# Patient Record
Sex: MALE | Race: WHITE | NOT HISPANIC OR LATINO | Employment: OTHER | URBAN - METROPOLITAN AREA
[De-identification: names, ages, dates, MRNs, and addresses within clinical notes are randomized per-mention and may not be internally consistent; named-entity substitution may affect disease eponyms.]

---

## 2017-02-01 ENCOUNTER — GENERIC CONVERSION - ENCOUNTER (OUTPATIENT)
Dept: OTHER | Facility: OTHER | Age: 72
End: 2017-02-01

## 2017-03-02 ENCOUNTER — HOSPITAL ENCOUNTER (INPATIENT)
Facility: HOSPITAL | Age: 72
LOS: 1 days | Discharge: HOME | DRG: 871 | End: 2017-03-04
Attending: EMERGENCY MEDICINE | Admitting: HOSPITALIST
Payer: COMMERCIAL

## 2017-03-02 ENCOUNTER — APPOINTMENT (EMERGENCY)
Dept: RADIOLOGY | Facility: HOSPITAL | Age: 72
DRG: 871 | End: 2017-03-02
Payer: COMMERCIAL

## 2017-03-02 DIAGNOSIS — R91.8 MASS OF UPPER LOBE OF RIGHT LUNG: ICD-10-CM

## 2017-03-02 DIAGNOSIS — N17.9 ACUTE KIDNEY INJURY (HCC): ICD-10-CM

## 2017-03-02 DIAGNOSIS — J18.9 COMMUNITY ACQUIRED PNEUMONIA: Primary | ICD-10-CM

## 2017-03-02 DIAGNOSIS — E86.0 DEHYDRATION: ICD-10-CM

## 2017-03-02 LAB
ANION GAP BLD CALC-SCNC: 20 MMOL/L (ref 4–13)
ANION GAP SERPL CALCULATED.3IONS-SCNC: 8 MMOL/L (ref 4–13)
BASE EXCESS BLDA CALC-SCNC: -1 MMOL/L (ref -2–3)
BASOPHILS # BLD AUTO: 0.01 THOUSANDS/ΜL (ref 0–0.1)
BASOPHILS NFR BLD AUTO: 0 % (ref 0–1)
BUN BLD-MCNC: 29 MG/DL (ref 5–25)
BUN SERPL-MCNC: 27 MG/DL (ref 5–25)
CA-I BLD-SCNC: 1.02 MMOL/L (ref 1.12–1.32)
CA-I BLD-SCNC: 1.03 MMOL/L (ref 1.12–1.32)
CALCIUM SERPL-MCNC: 8.8 MG/DL (ref 8.3–10.1)
CHLORIDE BLD-SCNC: 99 MMOL/L (ref 100–108)
CHLORIDE SERPL-SCNC: 104 MMOL/L (ref 100–108)
CO2 SERPL-SCNC: 30 MMOL/L (ref 21–32)
CREAT BLD-MCNC: 1.1 MG/DL (ref 0.6–1.3)
CREAT SERPL-MCNC: 1.47 MG/DL (ref 0.6–1.3)
EOSINOPHIL # BLD AUTO: 0.02 THOUSAND/ΜL (ref 0–0.61)
EOSINOPHIL NFR BLD AUTO: 0 % (ref 0–6)
ERYTHROCYTE [DISTWIDTH] IN BLOOD BY AUTOMATED COUNT: 15 % (ref 11.6–15.1)
GFR SERPL CREATININE-BSD FRML MDRD: 47.4 ML/MIN/1.73SQ M
GFR SERPL CREATININE-BSD FRML MDRD: >60 ML/MIN/1.73SQ M
GLUCOSE SERPL-MCNC: 150 MG/DL (ref 65–140)
GLUCOSE SERPL-MCNC: 151 MG/DL (ref 65–140)
GLUCOSE SERPL-MCNC: 168 MG/DL (ref 65–140)
HCO3 BLDA-SCNC: 22.9 MMOL/L (ref 24–30)
HCT VFR BLD AUTO: 35 % (ref 36.5–49.3)
HCT VFR BLD CALC: 28 % (ref 36.5–49.3)
HCT VFR BLD CALC: 28 % (ref 36.5–49.3)
HGB BLD-MCNC: 11.5 G/DL (ref 12–17)
HGB BLDA-MCNC: 9.5 G/DL (ref 12–17)
HGB BLDA-MCNC: 9.5 G/DL (ref 12–17)
LYMPHOCYTES # BLD AUTO: 1.45 THOUSANDS/ΜL (ref 0.6–4.47)
LYMPHOCYTES NFR BLD AUTO: 16 % (ref 14–44)
MCH RBC QN AUTO: 31.8 PG (ref 26.8–34.3)
MCHC RBC AUTO-ENTMCNC: 32.9 G/DL (ref 31.4–37.4)
MCV RBC AUTO: 97 FL (ref 82–98)
MONOCYTES # BLD AUTO: 0.88 THOUSAND/ΜL (ref 0.17–1.22)
MONOCYTES NFR BLD AUTO: 10 % (ref 4–12)
NEUTROPHILS # BLD AUTO: 6.76 THOUSANDS/ΜL (ref 1.85–7.62)
NEUTS SEG NFR BLD AUTO: 74 % (ref 43–75)
NRBC BLD AUTO-RTO: 0 /100 WBCS
PCO2 BLD: 23 MMOL/L (ref 21–32)
PCO2 BLD: 24 MMOL/L (ref 21–32)
PCO2 BLD: 36 MM HG (ref 42–50)
PH BLD: 7.41 [PH] (ref 7.3–7.4)
PLATELET # BLD AUTO: 236 THOUSANDS/UL (ref 149–390)
PMV BLD AUTO: 11.6 FL (ref 8.9–12.7)
PO2 BLD: 40 MM HG (ref 35–45)
POTASSIUM BLD-SCNC: 4.3 MMOL/L (ref 3.5–5.3)
POTASSIUM BLD-SCNC: 4.4 MMOL/L (ref 3.5–5.3)
POTASSIUM SERPL-SCNC: 4.2 MMOL/L (ref 3.5–5.3)
RBC # BLD AUTO: 3.62 MILLION/UL (ref 3.88–5.62)
SAO2 % BLD FROM PO2: 75 % (ref 95–98)
SODIUM BLD-SCNC: 136 MMOL/L (ref 136–145)
SODIUM BLD-SCNC: 136 MMOL/L (ref 136–145)
SODIUM SERPL-SCNC: 142 MMOL/L (ref 136–145)
SPECIMEN SOURCE: ABNORMAL
SPECIMEN SOURCE: ABNORMAL
SPECIMEN SOURCE: NORMAL
TROPONIN I BLD-MCNC: 0 NG/ML (ref 0–0.08)
TROPONIN I SERPL-MCNC: <0.02 NG/ML
WBC # BLD AUTO: 9.14 THOUSAND/UL (ref 4.31–10.16)

## 2017-03-02 PROCEDURE — 87040 BLOOD CULTURE FOR BACTERIA: CPT | Performed by: EMERGENCY MEDICINE

## 2017-03-02 PROCEDURE — 36415 COLL VENOUS BLD VENIPUNCTURE: CPT

## 2017-03-02 PROCEDURE — 82803 BLOOD GASES ANY COMBINATION: CPT

## 2017-03-02 PROCEDURE — 70498 CT ANGIOGRAPHY NECK: CPT

## 2017-03-02 PROCEDURE — 70450 CT HEAD/BRAIN W/O DYE: CPT

## 2017-03-02 PROCEDURE — 93005 ELECTROCARDIOGRAM TRACING: CPT | Performed by: EMERGENCY MEDICINE

## 2017-03-02 PROCEDURE — 85025 COMPLETE CBC W/AUTO DIFF WBC: CPT | Performed by: EMERGENCY MEDICINE

## 2017-03-02 PROCEDURE — 84484 ASSAY OF TROPONIN QUANT: CPT

## 2017-03-02 PROCEDURE — 82947 ASSAY GLUCOSE BLOOD QUANT: CPT

## 2017-03-02 PROCEDURE — 87400 INFLUENZA A/B EACH AG IA: CPT | Performed by: EMERGENCY MEDICINE

## 2017-03-02 PROCEDURE — 80048 BASIC METABOLIC PNL TOTAL CA: CPT | Performed by: EMERGENCY MEDICINE

## 2017-03-02 PROCEDURE — 85014 HEMATOCRIT: CPT

## 2017-03-02 PROCEDURE — 84295 ASSAY OF SERUM SODIUM: CPT

## 2017-03-02 PROCEDURE — 96365 THER/PROPH/DIAG IV INF INIT: CPT

## 2017-03-02 PROCEDURE — 83605 ASSAY OF LACTIC ACID: CPT | Performed by: EMERGENCY MEDICINE

## 2017-03-02 PROCEDURE — 82330 ASSAY OF CALCIUM: CPT

## 2017-03-02 PROCEDURE — 84132 ASSAY OF SERUM POTASSIUM: CPT

## 2017-03-02 PROCEDURE — 87798 DETECT AGENT NOS DNA AMP: CPT | Performed by: EMERGENCY MEDICINE

## 2017-03-02 PROCEDURE — 80047 BASIC METABLC PNL IONIZED CA: CPT

## 2017-03-02 PROCEDURE — 71275 CT ANGIOGRAPHY CHEST: CPT

## 2017-03-02 PROCEDURE — 96360 HYDRATION IV INFUSION INIT: CPT

## 2017-03-02 PROCEDURE — 93005 ELECTROCARDIOGRAM TRACING: CPT

## 2017-03-02 PROCEDURE — 84484 ASSAY OF TROPONIN QUANT: CPT | Performed by: EMERGENCY MEDICINE

## 2017-03-02 RX ORDER — ACETAMINOPHEN 325 MG/1
650 TABLET ORAL EVERY 6 HOURS SCHEDULED
Status: DISCONTINUED | OUTPATIENT
Start: 2017-03-03 | End: 2017-03-04 | Stop reason: HOSPADM

## 2017-03-02 RX ADMIN — SODIUM CHLORIDE 1000 ML: 0.9 INJECTION, SOLUTION INTRAVENOUS at 23:56

## 2017-03-02 RX ADMIN — CEFTRIAXONE 1000 MG: 1 INJECTION, POWDER, FOR SOLUTION INTRAMUSCULAR; INTRAVENOUS at 23:56

## 2017-03-02 RX ADMIN — SODIUM CHLORIDE 1000 ML: 0.9 INJECTION, SOLUTION INTRAVENOUS at 23:42

## 2017-03-02 RX ADMIN — IOHEXOL 100 ML: 350 INJECTION, SOLUTION INTRAVENOUS at 23:21

## 2017-03-03 PROBLEM — R91.1 PULMONARY NODULE: Status: ACTIVE | Noted: 2017-03-03

## 2017-03-03 PROBLEM — I10 ACCELERATED HYPERTENSION: Status: ACTIVE | Noted: 2017-03-03

## 2017-03-03 PROBLEM — J96.01 ACUTE RESPIRATORY FAILURE WITH HYPOXEMIA (HCC): Status: ACTIVE | Noted: 2017-03-03

## 2017-03-03 PROBLEM — R41.82 ALTERED MENTAL STATUS: Status: ACTIVE | Noted: 2017-03-03

## 2017-03-03 PROBLEM — A41.9 SEPSIS (HCC): Status: ACTIVE | Noted: 2017-03-03

## 2017-03-03 PROBLEM — N17.9 AKI (ACUTE KIDNEY INJURY) (HCC): Status: ACTIVE | Noted: 2017-03-03

## 2017-03-03 PROBLEM — E13.9 DIABETES 1.5, MANAGED AS TYPE 2 (HCC): Status: ACTIVE | Noted: 2017-03-03

## 2017-03-03 PROBLEM — C43.9 MELANOMA (HCC): Status: ACTIVE | Noted: 2017-03-03

## 2017-03-03 LAB
ANION GAP SERPL CALCULATED.3IONS-SCNC: 8 MMOL/L (ref 4–13)
ATRIAL RATE: 104 BPM
ATRIAL RATE: 202 BPM
BACTERIA UR QL AUTO: NORMAL /HPF
BILIRUB UR QL STRIP: NEGATIVE
BUN SERPL-MCNC: 23 MG/DL (ref 5–25)
CALCIUM SERPL-MCNC: 8 MG/DL (ref 8.3–10.1)
CHLORIDE SERPL-SCNC: 110 MMOL/L (ref 100–108)
CLARITY UR: CLEAR
CO2 SERPL-SCNC: 24 MMOL/L (ref 21–32)
COLOR UR: YELLOW
CREAT SERPL-MCNC: 1.24 MG/DL (ref 0.6–1.3)
ERYTHROCYTE [DISTWIDTH] IN BLOOD BY AUTOMATED COUNT: 15 % (ref 11.6–15.1)
FLUAV AG SPEC QL IA: NEGATIVE
FLUAV AG SPEC QL: NORMAL
FLUBV AG SPEC QL IA: NEGATIVE
FLUBV AG SPEC QL: NORMAL
GFR SERPL CREATININE-BSD FRML MDRD: 57.6 ML/MIN/1.73SQ M
GLUCOSE SERPL-MCNC: 134 MG/DL (ref 65–140)
GLUCOSE SERPL-MCNC: 144 MG/DL (ref 65–140)
GLUCOSE SERPL-MCNC: 183 MG/DL (ref 65–140)
GLUCOSE SERPL-MCNC: 200 MG/DL (ref 65–140)
GLUCOSE UR STRIP-MCNC: NEGATIVE MG/DL
HCT VFR BLD AUTO: 31.7 % (ref 36.5–49.3)
HGB BLD-MCNC: 10.4 G/DL (ref 12–17)
HGB UR QL STRIP.AUTO: ABNORMAL
HOLD SPECIMEN: NORMAL
HYALINE CASTS #/AREA URNS LPF: NORMAL /LPF
KETONES UR STRIP-MCNC: NEGATIVE MG/DL
L PNEUMO1 AG UR QL IA.RAPID: NEGATIVE
LACTATE SERPL-SCNC: 1.7 MMOL/L (ref 0.5–2)
LACTATE SERPL-SCNC: 2.1 MMOL/L (ref 0.5–2)
LEUKOCYTE ESTERASE UR QL STRIP: NEGATIVE
MCH RBC QN AUTO: 31.6 PG (ref 26.8–34.3)
MCHC RBC AUTO-ENTMCNC: 32.8 G/DL (ref 31.4–37.4)
MCV RBC AUTO: 96 FL (ref 82–98)
NITRITE UR QL STRIP: NEGATIVE
NON-SQ EPI CELLS URNS QL MICRO: NORMAL /HPF
P AXIS: 269 DEGREES
PH UR STRIP.AUTO: 5.5 [PH] (ref 4.5–8)
PLATELET # BLD AUTO: 200 THOUSANDS/UL (ref 149–390)
PLATELET # BLD AUTO: 200 THOUSANDS/UL (ref 149–390)
PMV BLD AUTO: 11.5 FL (ref 8.9–12.7)
PMV BLD AUTO: 11.6 FL (ref 8.9–12.7)
POTASSIUM SERPL-SCNC: 4 MMOL/L (ref 3.5–5.3)
PROT UR STRIP-MCNC: NEGATIVE MG/DL
QRS AXIS: 26 DEGREES
QRS AXIS: 39 DEGREES
QRSD INTERVAL: 70 MS
QRSD INTERVAL: 76 MS
QT INTERVAL: 288 MS
QT INTERVAL: 308 MS
QTC INTERVAL: 396 MS
QTC INTERVAL: 501 MS
RBC # BLD AUTO: 3.29 MILLION/UL (ref 3.88–5.62)
RBC #/AREA URNS AUTO: NORMAL /HPF
RSV B RNA SPEC QL NAA+PROBE: NORMAL
S PNEUM AG UR QL: NEGATIVE
SODIUM SERPL-SCNC: 142 MMOL/L (ref 136–145)
SP GR UR STRIP.AUTO: 1.01 (ref 1–1.03)
T WAVE AXIS: 63 DEGREES
T WAVE AXIS: 85 DEGREES
UROBILINOGEN UR QL STRIP.AUTO: 0.2 E.U./DL
VENTRICULAR RATE: 114 BPM
VENTRICULAR RATE: 159 BPM
WBC # BLD AUTO: 3.51 THOUSAND/UL (ref 4.31–10.16)
WBC #/AREA URNS AUTO: NORMAL /HPF

## 2017-03-03 PROCEDURE — 36415 COLL VENOUS BLD VENIPUNCTURE: CPT | Performed by: HOSPITALIST

## 2017-03-03 PROCEDURE — 94760 N-INVAS EAR/PLS OXIMETRY 1: CPT

## 2017-03-03 PROCEDURE — 99285 EMERGENCY DEPT VISIT HI MDM: CPT

## 2017-03-03 PROCEDURE — 81001 URINALYSIS AUTO W/SCOPE: CPT

## 2017-03-03 PROCEDURE — 85049 AUTOMATED PLATELET COUNT: CPT | Performed by: HOSPITALIST

## 2017-03-03 PROCEDURE — 87205 SMEAR GRAM STAIN: CPT | Performed by: HOSPITALIST

## 2017-03-03 PROCEDURE — 87086 URINE CULTURE/COLONY COUNT: CPT

## 2017-03-03 PROCEDURE — 80048 BASIC METABOLIC PNL TOTAL CA: CPT | Performed by: HOSPITALIST

## 2017-03-03 PROCEDURE — 87070 CULTURE OTHR SPECIMN AEROBIC: CPT | Performed by: HOSPITALIST

## 2017-03-03 PROCEDURE — 87449 NOS EACH ORGANISM AG IA: CPT | Performed by: HOSPITALIST

## 2017-03-03 PROCEDURE — 85027 COMPLETE CBC AUTOMATED: CPT | Performed by: HOSPITALIST

## 2017-03-03 PROCEDURE — 83605 ASSAY OF LACTIC ACID: CPT | Performed by: HOSPITALIST

## 2017-03-03 PROCEDURE — 82948 REAGENT STRIP/BLOOD GLUCOSE: CPT

## 2017-03-03 RX ORDER — PRAVASTATIN SODIUM 10 MG
10 TABLET ORAL
Status: DISCONTINUED | OUTPATIENT
Start: 2017-03-03 | End: 2017-03-04 | Stop reason: HOSPADM

## 2017-03-03 RX ORDER — DOCUSATE SODIUM 100 MG/1
100 CAPSULE, LIQUID FILLED ORAL 2 TIMES DAILY
Status: DISCONTINUED | OUTPATIENT
Start: 2017-03-03 | End: 2017-03-04 | Stop reason: HOSPADM

## 2017-03-03 RX ORDER — MELATONIN
1000 DAILY
Status: DISCONTINUED | OUTPATIENT
Start: 2017-03-03 | End: 2017-03-04 | Stop reason: HOSPADM

## 2017-03-03 RX ORDER — IRBESARTAN 75 MG/1
75 TABLET ORAL DAILY
Status: DISCONTINUED | OUTPATIENT
Start: 2017-03-03 | End: 2017-03-04 | Stop reason: HOSPADM

## 2017-03-03 RX ORDER — SENNOSIDES 8.6 MG
1 TABLET ORAL DAILY
Status: DISCONTINUED | OUTPATIENT
Start: 2017-03-03 | End: 2017-03-04 | Stop reason: HOSPADM

## 2017-03-03 RX ORDER — ONDANSETRON 2 MG/ML
4 INJECTION INTRAMUSCULAR; INTRAVENOUS EVERY 6 HOURS PRN
Status: DISCONTINUED | OUTPATIENT
Start: 2017-03-03 | End: 2017-03-04 | Stop reason: HOSPADM

## 2017-03-03 RX ORDER — ALBUTEROL SULFATE 90 UG/1
2 AEROSOL, METERED RESPIRATORY (INHALATION) EVERY 4 HOURS PRN
Status: DISCONTINUED | OUTPATIENT
Start: 2017-03-03 | End: 2017-03-04 | Stop reason: HOSPADM

## 2017-03-03 RX ORDER — SODIUM CHLORIDE 9 MG/ML
90 INJECTION, SOLUTION INTRAVENOUS CONTINUOUS
Status: DISCONTINUED | OUTPATIENT
Start: 2017-03-03 | End: 2017-03-03

## 2017-03-03 RX ORDER — PRAVASTATIN SODIUM 10 MG
10 TABLET ORAL DAILY
COMMUNITY

## 2017-03-03 RX ORDER — ASPIRIN 81 MG/1
81 TABLET, CHEWABLE ORAL DAILY
Status: DISCONTINUED | OUTPATIENT
Start: 2017-03-03 | End: 2017-03-04 | Stop reason: HOSPADM

## 2017-03-03 RX ORDER — ACETAMINOPHEN 325 MG/1
650 TABLET ORAL EVERY 4 HOURS PRN
Status: DISCONTINUED | OUTPATIENT
Start: 2017-03-03 | End: 2017-03-04 | Stop reason: HOSPADM

## 2017-03-03 RX ORDER — SODIUM CHLORIDE 9 MG/ML
100 INJECTION, SOLUTION INTRAVENOUS CONTINUOUS
Status: DISCONTINUED | OUTPATIENT
Start: 2017-03-03 | End: 2017-03-04

## 2017-03-03 RX ADMIN — ACETAMINOPHEN 650 MG: 325 TABLET, FILM COATED ORAL at 00:03

## 2017-03-03 RX ADMIN — INSULIN LISPRO 1 UNITS: 100 INJECTION, SOLUTION INTRAVENOUS; SUBCUTANEOUS at 22:09

## 2017-03-03 RX ADMIN — ASPIRIN 81 MG: 81 TABLET, CHEWABLE ORAL at 13:47

## 2017-03-03 RX ADMIN — ENOXAPARIN SODIUM 40 MG: 40 INJECTION SUBCUTANEOUS at 08:42

## 2017-03-03 RX ADMIN — IRBESARTAN 75 MG: 75 TABLET ORAL at 08:44

## 2017-03-03 RX ADMIN — PRAVASTATIN SODIUM 10 MG: 10 TABLET ORAL at 18:16

## 2017-03-03 RX ADMIN — VITAMIN D, TAB 1000IU (100/BT) 1000 UNITS: 25 TAB at 08:44

## 2017-03-03 RX ADMIN — SODIUM CHLORIDE 90 ML/HR: 0.9 INJECTION, SOLUTION INTRAVENOUS at 02:48

## 2017-03-03 RX ADMIN — SODIUM CHLORIDE 100 ML/HR: 0.9 INJECTION, SOLUTION INTRAVENOUS at 22:16

## 2017-03-03 RX ADMIN — AZITHROMYCIN MONOHYDRATE 500 MG: 500 INJECTION, POWDER, LYOPHILIZED, FOR SOLUTION INTRAVENOUS at 00:44

## 2017-03-03 RX ADMIN — ACETAMINOPHEN 650 MG: 325 TABLET, FILM COATED ORAL at 05:38

## 2017-03-03 RX ADMIN — ACETAMINOPHEN 650 MG: 325 TABLET, FILM COATED ORAL at 18:15

## 2017-03-03 RX ADMIN — SODIUM CHLORIDE 100 ML/HR: 0.9 INJECTION, SOLUTION INTRAVENOUS at 13:48

## 2017-03-04 VITALS
HEIGHT: 68 IN | HEART RATE: 84 BPM | WEIGHT: 184 LBS | RESPIRATION RATE: 16 BRPM | DIASTOLIC BLOOD PRESSURE: 58 MMHG | TEMPERATURE: 97.6 F | BODY MASS INDEX: 27.89 KG/M2 | OXYGEN SATURATION: 93 % | SYSTOLIC BLOOD PRESSURE: 117 MMHG

## 2017-03-04 PROBLEM — J18.9 COMMUNITY ACQUIRED PNEUMONIA: Status: ACTIVE | Noted: 2017-03-04

## 2017-03-04 PROBLEM — J96.01 ACUTE RESPIRATORY FAILURE WITH HYPOXEMIA (HCC): Status: RESOLVED | Noted: 2017-03-03 | Resolved: 2017-03-04

## 2017-03-04 PROBLEM — A41.9 SEPSIS (HCC): Status: RESOLVED | Noted: 2017-03-03 | Resolved: 2017-03-04

## 2017-03-04 PROBLEM — R41.82 ALTERED MENTAL STATUS: Status: RESOLVED | Noted: 2017-03-03 | Resolved: 2017-03-04

## 2017-03-04 PROBLEM — N17.9 AKI (ACUTE KIDNEY INJURY) (HCC): Status: RESOLVED | Noted: 2017-03-03 | Resolved: 2017-03-04

## 2017-03-04 LAB
ANION GAP SERPL CALCULATED.3IONS-SCNC: 8 MMOL/L (ref 4–13)
BACTERIA UR CULT: NORMAL
BASOPHILS # BLD MANUAL: 0.07 THOUSAND/UL (ref 0–0.1)
BASOPHILS NFR MAR MANUAL: 1 % (ref 0–1)
BUN SERPL-MCNC: 13 MG/DL (ref 5–25)
BURR CELLS BLD QL SMEAR: PRESENT
CALCIUM SERPL-MCNC: 8.1 MG/DL (ref 8.3–10.1)
CHLORIDE SERPL-SCNC: 108 MMOL/L (ref 100–108)
CO2 SERPL-SCNC: 26 MMOL/L (ref 21–32)
CREAT SERPL-MCNC: 1 MG/DL (ref 0.6–1.3)
EOSINOPHIL # BLD MANUAL: 0.15 THOUSAND/UL (ref 0–0.4)
EOSINOPHIL NFR BLD MANUAL: 2 % (ref 0–6)
ERYTHROCYTE [DISTWIDTH] IN BLOOD BY AUTOMATED COUNT: 15 % (ref 11.6–15.1)
GFR SERPL CREATININE-BSD FRML MDRD: >60 ML/MIN/1.73SQ M
GIANT PLATELETS BLD QL SMEAR: PRESENT
GLUCOSE SERPL-MCNC: 151 MG/DL (ref 65–140)
GLUCOSE SERPL-MCNC: 158 MG/DL (ref 65–140)
GLUCOSE SERPL-MCNC: 161 MG/DL (ref 65–140)
HCT VFR BLD AUTO: 29.5 % (ref 36.5–49.3)
HGB BLD-MCNC: 9.7 G/DL (ref 12–17)
LYMPHOCYTES # BLD AUTO: 0.59 THOUSAND/UL (ref 0.6–4.47)
LYMPHOCYTES # BLD AUTO: 8 % (ref 14–44)
MCH RBC QN AUTO: 31.9 PG (ref 26.8–34.3)
MCHC RBC AUTO-ENTMCNC: 32.9 G/DL (ref 31.4–37.4)
MCV RBC AUTO: 97 FL (ref 82–98)
METAMYELOCYTES NFR BLD MANUAL: 19 % (ref 0–1)
MONOCYTES # BLD AUTO: 0.07 THOUSAND/UL (ref 0–1.22)
MONOCYTES NFR BLD: 1 % (ref 4–12)
NEUTROPHILS # BLD MANUAL: 5.13 THOUSAND/UL (ref 1.85–7.62)
NEUTS BAND NFR BLD MANUAL: 22 % (ref 0–8)
NEUTS SEG NFR BLD AUTO: 47 % (ref 43–75)
NRBC BLD AUTO-RTO: 0 /100 WBCS
PLATELET # BLD AUTO: 165 THOUSANDS/UL (ref 149–390)
PLATELET BLD QL SMEAR: ADEQUATE
PMV BLD AUTO: 10.9 FL (ref 8.9–12.7)
POIKILOCYTOSIS BLD QL SMEAR: PRESENT
POTASSIUM SERPL-SCNC: 3.9 MMOL/L (ref 3.5–5.3)
RBC # BLD AUTO: 3.04 MILLION/UL (ref 3.88–5.62)
RBC MORPH BLD: PRESENT
SODIUM SERPL-SCNC: 142 MMOL/L (ref 136–145)
WBC # BLD AUTO: 7.43 THOUSAND/UL (ref 4.31–10.16)

## 2017-03-04 PROCEDURE — 85007 BL SMEAR W/DIFF WBC COUNT: CPT | Performed by: INTERNAL MEDICINE

## 2017-03-04 PROCEDURE — 85027 COMPLETE CBC AUTOMATED: CPT | Performed by: INTERNAL MEDICINE

## 2017-03-04 PROCEDURE — 82948 REAGENT STRIP/BLOOD GLUCOSE: CPT

## 2017-03-04 PROCEDURE — 80048 BASIC METABOLIC PNL TOTAL CA: CPT | Performed by: INTERNAL MEDICINE

## 2017-03-04 RX ORDER — LEVOFLOXACIN 750 MG/1
750 TABLET ORAL DAILY
Qty: 5 TABLET | Refills: 0 | Status: SHIPPED | OUTPATIENT
Start: 2017-03-05 | End: 2017-03-10

## 2017-03-04 RX ADMIN — SENNOSIDES 8.6 MG: 8.6 TABLET, FILM COATED ORAL at 08:44

## 2017-03-04 RX ADMIN — ACETAMINOPHEN 650 MG: 325 TABLET, FILM COATED ORAL at 11:44

## 2017-03-04 RX ADMIN — ENOXAPARIN SODIUM 40 MG: 40 INJECTION SUBCUTANEOUS at 08:44

## 2017-03-04 RX ADMIN — ASPIRIN 81 MG: 81 TABLET, CHEWABLE ORAL at 08:44

## 2017-03-04 RX ADMIN — IRBESARTAN 75 MG: 75 TABLET ORAL at 08:44

## 2017-03-04 RX ADMIN — CEFTRIAXONE 1000 MG: 1 INJECTION, POWDER, FOR SOLUTION INTRAMUSCULAR; INTRAVENOUS at 01:43

## 2017-03-04 RX ADMIN — ACETAMINOPHEN 650 MG: 325 TABLET, FILM COATED ORAL at 06:38

## 2017-03-04 RX ADMIN — VITAMIN D, TAB 1000IU (100/BT) 1000 UNITS: 25 TAB at 08:44

## 2017-03-04 RX ADMIN — INSULIN LISPRO 1 UNITS: 100 INJECTION, SOLUTION INTRAVENOUS; SUBCUTANEOUS at 08:44

## 2017-03-04 RX ADMIN — AZITHROMYCIN MONOHYDRATE 500 MG: 500 INJECTION, POWDER, LYOPHILIZED, FOR SOLUTION INTRAVENOUS at 02:40

## 2017-03-06 LAB
BACTERIA SPT RESP CULT: NORMAL
GRAM STN SPEC: NORMAL

## 2017-03-08 LAB
BACTERIA BLD CULT: NORMAL
BACTERIA BLD CULT: NORMAL

## 2017-03-16 ENCOUNTER — GENERIC CONVERSION - ENCOUNTER (OUTPATIENT)
Dept: OTHER | Facility: OTHER | Age: 72
End: 2017-03-16

## 2017-03-17 ENCOUNTER — ALLSCRIPTS OFFICE VISIT (OUTPATIENT)
Dept: OTHER | Facility: OTHER | Age: 72
End: 2017-03-17

## 2017-03-17 DIAGNOSIS — J18.9 PNEUMONIA: ICD-10-CM

## 2017-03-17 DIAGNOSIS — R91.1 SOLITARY PULMONARY NODULE: ICD-10-CM

## 2017-03-22 ENCOUNTER — HOSPITAL ENCOUNTER (OUTPATIENT)
Dept: RADIOLOGY | Facility: HOSPITAL | Age: 72
Discharge: HOME | End: 2017-03-22
Attending: FAMILY MEDICINE
Payer: COMMERCIAL

## 2017-03-22 ENCOUNTER — TRANSCRIBE ORDERS (OUTPATIENT)
Dept: ADMINISTRATIVE | Facility: HOSPITAL | Age: 72
End: 2017-03-22

## 2017-03-22 DIAGNOSIS — R91.1 SOLITARY PULMONARY NODULE: ICD-10-CM

## 2017-03-22 DIAGNOSIS — J18.9 PNEUMONIA: ICD-10-CM

## 2017-03-22 PROCEDURE — 71020 HB CHEST X-RAY 2VW FRONTAL&LATL: CPT

## 2017-03-24 ENCOUNTER — GENERIC CONVERSION - ENCOUNTER (OUTPATIENT)
Dept: OTHER | Facility: OTHER | Age: 72
End: 2017-03-24

## 2017-03-29 ENCOUNTER — GENERIC CONVERSION - ENCOUNTER (OUTPATIENT)
Dept: OTHER | Facility: OTHER | Age: 72
End: 2017-03-29

## 2017-03-30 ENCOUNTER — GENERIC CONVERSION - ENCOUNTER (OUTPATIENT)
Dept: OTHER | Facility: OTHER | Age: 72
End: 2017-03-30

## 2017-03-30 LAB
LEFT EYE DIABETIC RETINOPATHY: NORMAL
RIGHT EYE DIABETIC RETINOPATHY: NORMAL

## 2017-04-05 ENCOUNTER — GENERIC CONVERSION - ENCOUNTER (OUTPATIENT)
Dept: OTHER | Facility: OTHER | Age: 72
End: 2017-04-05

## 2017-04-10 ENCOUNTER — ALLSCRIPTS OFFICE VISIT (OUTPATIENT)
Dept: OTHER | Facility: OTHER | Age: 72
End: 2017-04-10

## 2017-04-21 ENCOUNTER — GENERIC CONVERSION - ENCOUNTER (OUTPATIENT)
Dept: OTHER | Facility: OTHER | Age: 72
End: 2017-04-21

## 2017-05-03 ENCOUNTER — GENERIC CONVERSION - ENCOUNTER (OUTPATIENT)
Dept: OTHER | Facility: OTHER | Age: 72
End: 2017-05-03

## 2017-05-10 ENCOUNTER — GENERIC CONVERSION - ENCOUNTER (OUTPATIENT)
Dept: OTHER | Facility: OTHER | Age: 72
End: 2017-05-10

## 2017-05-24 ENCOUNTER — GENERIC CONVERSION - ENCOUNTER (OUTPATIENT)
Dept: OTHER | Facility: OTHER | Age: 72
End: 2017-05-24

## 2017-06-05 ENCOUNTER — GENERIC CONVERSION - ENCOUNTER (OUTPATIENT)
Dept: OTHER | Facility: OTHER | Age: 72
End: 2017-06-05

## 2017-06-14 ENCOUNTER — GENERIC CONVERSION - ENCOUNTER (OUTPATIENT)
Dept: OTHER | Facility: OTHER | Age: 72
End: 2017-06-14

## 2017-07-12 ENCOUNTER — GENERIC CONVERSION - ENCOUNTER (OUTPATIENT)
Dept: OTHER | Facility: OTHER | Age: 72
End: 2017-07-12

## 2018-01-10 ENCOUNTER — ALLSCRIPTS OFFICE VISIT (OUTPATIENT)
Dept: OTHER | Facility: OTHER | Age: 73
End: 2018-01-10

## 2018-01-12 NOTE — RESULT NOTES
Verified Results  * XR SHOULDER 2+ VIEW LEFT 04Oct2016 10:28AM Bernarda Alfonso Order Number: [id number]     Test Name Result Flag Reference   XR SHOULDER 2+ VW LEFT (Report)     LEFT SHOULDER     INDICATION: Generalized left shoulder pain x4 days  COMPARISON: None     VIEWS: 3; 3 images     FINDINGS:     There is no acute fracture or dislocation  Mild degenerative changes in the glenohumeral joint with small marginal osteophytes  Mild degenerative changes also seen in the acromioclavicular joint  No lytic or blastic lesions are seen  There are calcifications in the soft tissues adjacent to the humeral head, largest measuring 12 x 5 mm, suggestive of calcific tendinitis       IMPRESSION:   No acute osseous abnormality  Findings suggestive of calcific tendinitis  Degenerative changes as described         Workstation performed: [id number]     Signed by:   Edmond Chang MD   10/5/16

## 2018-01-13 VITALS
TEMPERATURE: 97.6 F | BODY MASS INDEX: 27.2 KG/M2 | SYSTOLIC BLOOD PRESSURE: 110 MMHG | HEIGHT: 70 IN | DIASTOLIC BLOOD PRESSURE: 62 MMHG | WEIGHT: 190 LBS | RESPIRATION RATE: 12 BRPM | HEART RATE: 80 BPM | OXYGEN SATURATION: 88 %

## 2018-01-13 NOTE — PROGRESS NOTES
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  At that time he underwent a wide excision of the area  He continues to follow with [facility] every 6 months  He underwent a RUL lung resection at [facility] in summer of 2017 and has recovered well  He does report SOB after his surgery  Denies headaches, back pain, bone pain, cough, abdominal pain  No worrisome findings on today's exam  Patient has requested an annual appt rather then twice a year, as he has many doctors appointments  This is reasonable  Instructed patient on s/s to look for  He was instructed to call with any new concerns or symptoms  All of his questions were answered  Counseling Documentation With Imm: The patient was counseled regarding instructions for management,-- impressions  Chief Complaint   Chief Complaint Free Text Note Form: Patient presents today for a 9 month follow-up visit for a 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  History of Present Illness   Diagnosis and Staging: [id number] N0 M0 left forearm melanoma         Treatment History: Wide excision March 2015    Current Therapy: Observation    Disease Status: CLAUDE    Interval History: Patient presents today for a 9 month follow-up visit for a 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  He has no new complaints today   He continues to see [facility] every 6 months- reports last visit in September with no worrisome findings  He underwent a RUL lung resection at [facility] in summer of 2017 and has recovered well from that  He did not require adjuvant therapy  Review of Systems   Complete Male ROS Surg Onc:      Constitutional: The patient denies new or recent history of general fatigue, no recent weight loss, no change in appetite  Eyes: No complaints of visual problems, no scleral icterus  ENT: no complaints of ear pain, no hoarseness, no difficulty swallowing,-- no tinnitus-- and-- no new masses in head, oral cavity, or neck  Cardiovascular: No complaints of chest pain, no palpitations, no ankle edema  Respiratory: shortness of breath, but-- no cough  Gastrointestinal: No complaints of jaundice, no bloody stools, no pale stools  Genitourinary: No complaints of dysuria, no hematuria, no nocturia, no frequent urination, no urethral discharge  Musculoskeletal: No complaints of weakness, paralysis, joint stiffness or arthralgias,  Integumentary: No complaints of rash, no new lesions  Neurological: No complaints of convulsions, no seizures, no dizziness  Hematologic/Lymphatic: No complaints of easy bruising  ROS Reviewed:    ROS reviewed  Active Problems   1  Community acquired pneumonia (5) (J18 9)   2  Diabetes Mellitus (250 00)   3  H/O malignant melanoma of skin (V10 82) (Z85 820)   4  Hypertension (401 9) (I10)   5  Lower Back Sprain (846 9)   6  Nasal polyp (471 9) (J33 9)   7  Need for prophylactic vaccination against Streptococcus pneumoniae (pneumococcus)     (V03 82) (Z23)   8  Need for prophylactic vaccination and inoculation against influenza (V04 81) (Z23)   9  Nocturia (788 43) (R35 1)   10  Pain in joint of left shoulder (719 41) (M25 512)   11  Prostate cancer screening (V76 44) (Z12 5)   12  Pulmonary nodule (793 11) (R91 1)   13  Screening for colorectal cancer (V76 51) (Z12 11,Z12 12)   14   Screening for diabetic retinopathy (V80 2) (Z13 5)   15  Spinal stenosis (724 00) (M48 00)   16  Well adult on routine health check (V70 0) (Z00 00)    Past Medical History   1  History of Acute bronchitis due to other specified organisms (466 0) (J20 8)   2  History of Allergic contact dermatitis due to plants, except food (692 6) (L23 7)   3  History of Community acquired pneumonia (5) (J18 9)   4  History of Cough (786 2) (R05)   5  History of acute bronchitis (V12 69) (Z87 09)   6  History of sunburn (V13 3) (Z87 2)   7  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 60)   8  History of Viral URI (465 9) (J06 9,B97 89)    Surgical History   1  History of Ankle Surgery   2  History of Excision Of Lesion Arms Malignant  Surgical History Reviewed: The surgical history was reviewed and updated today  Family History   Father    1  Family history of Pacemaker Placement  Family History    2  Family history of Diabetes Mellitus (V18 0)  Family History Reviewed: The family history was reviewed and updated today  Social History    · Denied: History of Alcohol Use (History)   · Denied: History of Drug Use   · Former smoker (G18 70) (E36 292)  Social History Reviewed: The social history was reviewed and updated today  The social history was reviewed and is unchanged  Current Meds    1  Accu-Chek FastClix Lancets Miscellaneous; Therapy: 32ZFW4228 to Recorded   2  Aspirin 81 MG TABS; Therapy: (Recorded:10Mar2015) to Recorded   3  Avapro TABS; Therapy: (Recorded:10Mar2015) to Recorded   4  BD Pen Needle Deanne U/F 32G X 4 MM Miscellaneous; Therapy: 28Rsh1318 to Recorded   5  Finasteride 5 MG Oral Tablet; Therapy: (Recorded:10Jan2018) to Recorded   6  MetFORMIN HCl - 1000 MG Oral Tablet; Take 1 tablet twice a day; Therapy: 25VFT1302 to (Evaluate:14Oct2017)  Requested for: 19Oct2016 Recorded   7  Nitrofurantoin Monohyd Macro 100 MG Oral Capsule; Therapy: (Recorded:10Jan2018) to Recorded   8  Pravastatin Sodium 10 MG Oral Tablet; Therapy: 22DOG1014 to Recorded   9  Spiriva HandiHaler 18 MCG Inhalation Capsule; Therapy: (Recorded:93Ukd9263) to Recorded   10  Spiriva HandiHaler 18 MCG Inhalation Capsule; Therapy: 98ROF2925 to Recorded   11  Victoza 18 MG/3ML SOLN;      Therapy: (Recorded:10Mar2015) to Recorded   12  Vitamin D 2000 UNIT Oral Tablet; Therapy: (Recorded:10Mar2015) to Recorded  Medication List Reviewed: The medication list was reviewed and updated today  Allergies   1  No Known Drug Allergies  2  No Known Environmental Allergies   3  No Known Food Allergies    Vitals   Vital Signs    Recorded: 87TWF9473 11:20AM   Temperature 98 3 F   Heart Rate 80   Respiration 14   Systolic 317   Diastolic 64   Height 5 ft 10 in   Weight 181 lb 4 96 oz   BMI Calculated 26 02   BSA Calculated 2   O2 Saturation 97     Physical Exam        Constitutional: General appearance: The Patient is well-developed, well-nourished male who appears his stated age in no acute distress  He is pleasant and talkative  HEENT: Sclerae are anicteric  -- Mucous membranes are moist  -- Neck is supple without adenopathy  No JVD  Chest: The lungs are clear to auscultation  Cardiac: Heart is regular rate  Abdomen: Abdomen is soft, nontender without masses  Extremities: There is no clubbing or cyanosis  -- There is no edema  Neuro: Grossly nonfocal  -- Gait is normal        Lymphatic: no evidence of cervical adenopathy bilaterally  -- no evidence of axillary adenopathy bilaterally  Skin: Warm, anicteric  Additional Exam:  Left forearm incision well healed, no nodularity or skin lesions  No bilateral axillary lymphadenopathy  Health Management   Screening for colorectal cancer   COLONOSCOPY; every 10 years; Last 43DQT0800; Next Due: 67GZW9681; Active    End of Encounter Meds   1  Accu-Chek FastClix Lancets Miscellaneous;      Therapy: 19SRI4825 to Recorded 2  MetFORMIN HCl - 1000 MG Oral Tablet; Take 1 tablet twice a day; Therapy: 70UQF0468 to (Evaluate:14Oct2017)  Requested for: 19Oct2016 Recorded   3  Victoza 18 MG/3ML SOLN;     Therapy: (Recorded:10Mar2015) to Recorded  4  Aspirin 81 MG TABS; Therapy: (Recorded:10Mar2015) to Recorded   5  Vitamin D 2000 UNIT Oral Tablet; Therapy: (Recorded:10Mar2015) to Recorded  6  Avapro TABS (Irbesartan); Therapy: (Recorded:10Mar2015) to Recorded  7  BD Pen Needle Deanne U/F 32G X 4 MM Miscellaneous; Therapy: 23Rsf1031 to Recorded   8  Pravastatin Sodium 10 MG Oral Tablet; Therapy: 35FBT0571 to Recorded   9  Spiriva HandiHaler 18 MCG Inhalation Capsule; Therapy: (Recorded:10Apr2017) to Recorded   10  Spiriva HandiHaler 18 MCG Inhalation Capsule; Therapy: 07Apr2017 to Recorded    Signatures    Electronically signed by :  Claudeen Madeira, CRNP; Jona 10 2018 11:56AM EST                       (Author)     Electronically signed by : Darby Sampson MD; Jan 12 2018  4:00PM EST

## 2018-01-13 NOTE — RESULT NOTES
Verified Results  * XR CHEST PA & LATERAL 87EQX6126 11:05AM Faith Stratton Order Number: [id number]   Performing Comments: 79 yo male with recent LLL pneumonia 0 in need of post-treatment xray for clearance  Hx of RUL nodule known  Thanks  Denton Hicks DO     Test Name Result Flag Reference   XR CHEST PA & LATERAL (Report)     CHEST DUAL ENERGY     INDICATION: Recent left lower lobe pneumonia  Evaluate for clearance  COMPARISON: Chest x-ray to 19 2016 and CT scan 3/2/2017     VIEWS: PA (including soft tissue and bone algorithms) and lateral projections; 4 images     FINDINGS:        Cardiomediastinal silhouette appears unremarkable  The lungs are clear  No pneumothorax or pleural effusion  Visualized osseous structures appear within normal limits for the patient's age  IMPRESSION:     No active pulmonary disease  Workstation performed: [id number]     Signed by:    Orly Hernandez MD   3/24/17

## 2018-01-13 NOTE — PROGRESS NOTES
Assessment    1  Well adult on routine health check (V70 0) (Z00 00)   2  Need for prophylactic vaccination against Streptococcus pneumoniae (pneumococcus)   (V03 82) (Z23)   3  Need for prophylactic vaccination and inoculation against influenza (V04 81) (Z23)   4  Prostate cancer screening (V76 44) (Z12 5)    Plan  Need for prophylactic vaccination against Streptococcus pneumoniae (pneumococcus)    · Pneumo (Pneumovax)  Need for prophylactic vaccination and inoculation against influenza    · Fluzone High-Dose 0 5 ML Intramuscular Suspension Prefilled Syringe  Prostate cancer screening    · (Q) PSA, TOTAL WITH REFLEX TO PSA, FREE; Status:Active; Requested for:19Oct2016;   Well adult on routine health check    · Follow-up visit in 1 year Evaluation and Treatment  Follow-up  Status: Hold For -  Scheduling  Requested for: 89MGR3479   · Eat a low fat and low cholesterol diet ; Status:Complete;   Done: 23CPZ0479   · There are many exercise options for seniors ; Status:Complete;   Done: 56KHW8233   · Use a sun block product with an SPF of 15 or more ; Status:Complete;   Done:  67YEC3816   · Call [phone] if: You have any warning signs of skin cancer ; Status:Complete;    Done: 95JVH7045   · Seek Immediate Medical Attention if: You experience a new kind of chest pain (angina)  or pressure ; Status:Complete;   Done: 67OSH5023    Discussion/Summary  Impression: health maintenance visit  Currently, he eats a healthy diet and has an adequate exercise regimen  Prostate cancer screening: prostate cancer screening is current  Testicular cancer screening: self testicular exam technique was taught  Colorectal cancer screening: colorectal cancer screening is current  Advice and education were given regarding aerobic exercise, weight bearing exercise and vitamin D supplements  MR FERNÁNDEZ IS MEDICALLY STABLE  HE SEES ENDOCRINE DR TIM AND HAS ROUTINE LABS DONE THROUGH THEM  I WILL TRY TO OBTAIN A COPY   HE ALSO SEES DERM DUE TO HX OF MELANOMA  I ORDERED A PSA TODAY  HE WILL HAVE FLU AND PNEUMOVAX HERE  HE NEVER HAD CHICKEN POX AND THEREFORE DOESN'T WANT TO SHINGLES VACCINE  Possible side effects of new medications were reviewed with the patient/guardian today  Chief Complaint  Patient here for Annual wellness exam      History of Present Illness  HM, Adult Male: The patient is being seen for a health maintenance evaluation  General Health: The patient's health since the last visit is described as good  He has regular dental visits  He denies vision problems  He denies hearing loss  Immunizations status: not up to date  Lifestyle:  He consumes a diverse and healthy diet  He does not have any weight concerns  He exercises regularly  He does not use tobacco  He denies alcohol use  He denies drug use  Screening:   HPI: Johnny LEON FOR DERM       Review of Systems    Constitutional: No fever or chills, feels well, no tiredness, no recent weight gain or weight loss  Eyes: No complaints of eye pain, no red eyes, no discharge from eyes, no itchy eyes  ENT: no complaints of earache, no hearing loss, no nosebleeds, no nasal discharge, no sore throat, no hoarseness  Cardiovascular: No complaints of slow heart rate, no fast heart rate, no chest pain, no palpitations, no leg claudication, no lower extremity  Respiratory: No complaints of shortness of breath, no wheezing, no cough, no SOB on exertion, no orthopnea or PND  Gastrointestinal: No complaints of abdominal pain, no constipation, no nausea or vomiting, no diarrhea or bloody stools  Genitourinary: No complaints of dysuria, no incontinence, no hesitancy, no nocturia, no genital lesion, no testicular pain  Musculoskeletal: SHOULDER PAIN, but as noted in HPI  Integumentary: No complaints of skin rash or skin lesions, no itching, no skin wound, no dry skin     Neurological: No compliants of headache, no confusion, no convulsions, no numbness or tingling, no dizziness or fainting, no limb weakness, no difficulty walking  Psychiatric: Is not suicidal, no sleep disturbances, no anxiety or depression, no change in personality, no emotional problems  Endocrine: No complaints of proptosis, no hot flashes, no muscle weakness, no erectile dysfunction, no deepening of the voice, no feelings of weakness  Hematologic/Lymphatic: No complaints of swollen glands, no swollen glands in the neck, does not bleed easily, no easy bruising  Active Problems    1  Diabetes Mellitus (250 00)   2  H/O malignant melanoma of skin (V10 82) (Z85 820)   3  Hypertension (401 9) (I10)   4  Lower Back Sprain (846 9)   5  Nocturia (788 43) (R35 1)   6  Pain in joint of left shoulder (719 41) (M25 512)   7  Screening for colorectal cancer (V76 51) (Z12 11,Z12 12)   8  Screening for diabetic retinopathy (V80 2) (Z13 5)   9  Spinal stenosis (724 00) (M48 00)    Past Medical History    · History of Acute bronchitis due to other specified organisms (466 0) (J20 8)   · History of Allergic contact dermatitis due to plants, except food (692 6) (L23 7)   · History of Community acquired pneumonia (486) (J18 9)   · History of Cough (786 2) (R05)   · History of acute bronchitis (V12 69) (Z87 09)   · History of sunburn (V13 3) (Z87 2)   · 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 60)   · History of Viral URI (465 9) (J06 9,B97 89)    Surgical History    · History of Ankle Surgery    Family History  Father    · Family history of Pacemaker Placement  Family History    · Family history of Diabetes Mellitus (V18 0)    Social History    · Denied: History of Alcohol Use (History)   · Denied: History of Drug Use   · Former smoker (V15 82) (U23 538)    Current Meds   1  Accu-Chek FastClix Lancets Miscellaneous; Therapy: 97PRL0196 to Recorded   2  Aspirin 81 MG TABS; Therapy: (Recorded:10Mar2015) to Recorded   3  Avapro TABS;    Therapy: (Recorded:10Mar2015) to Recorded   4  MetFORMIN HCl - 1000 MG Oral Tablet; Take 1 tablet twice a day; Therapy: 41SPF6360 to (Evaluate:14Oct2017)  Requested for: 19Oct2016 Recorded   5  Ventolin  (90 Base) MCG/ACT Inhalation Aerosol Solution; INHALE 2 PUFFS   EVERY 4-6 HOURS AS NEEDED; Therapy: 43YJV7096 to (Last Rx:03Oct2016)  Requested for: 71PMW5903 Ordered   6  Victoza 18 MG/3ML SOLN;   Therapy: (Recorded:10Mar2015) to Recorded   7  Vitamin D 2000 UNIT Oral Tablet; Therapy: (Recorded:10Mar2015) to Recorded    Allergies    1  No Known Drug Allergies    2  No Known Environmental Allergies   3  No Known Food Allergies    Vitals   Recorded: 71EUR6169 18:35FP   Systolic 730   Diastolic 60   Heart Rate 72   Respiration 18   Height 5 ft 9 76 in   Weight 182 lb 6 oz   BMI Calculated 26 35   BSA Calculated 2     Physical Exam    Constitutional   General appearance: No acute distress, well appearing and well nourished  Head and Face   Head and face: Normal     Eyes   Conjunctiva and lids: No erythema, swelling or discharge  Pupils and irises: Equal, round, reactive to light  Ears, Nose, Mouth, and Throat   External inspection of ears and nose: Normal     Otoscopic examination: Tympanic membranes translucent with normal light reflex  Canals patent without erythema  Hearing: Normal     Nasal mucosa, septum, and turbinates: Normal without edema or erythema  Lips, teeth, and gums: Normal, good dentition  Oropharynx: Normal with no erythema, edema, exudate or lesions  Neck   Neck: Supple, symmetric, trachea midline, no masses  Thyroid: Normal, no thyromegaly  Pulmonary   Respiratory effort: No increased work of breathing or signs of respiratory distress  Auscultation of lungs: Clear to auscultation  Cardiovascular   Auscultation of heart: Normal rate and rhythm, normal S1 and S2, no murmurs  Examination of extremities for edema and/or varicosities: Normal     Abdomen   Abdomen: Non-tender, no masses  Liver and spleen: No hepatomegaly or splenomegaly  Lymphatic   Palpation of lymph nodes in neck: No lymphadenopathy  Palpation of lymph nodes in axillae: No lymphadenopathy  Musculoskeletal   Gait and station: Normal     Inspection/palpation of digits and nails: Normal without clubbing or cyanosis  Inspection/palpation of joints, bones, and muscles: Normal     Range of motion: Normal     Stability: Normal     Muscle strength/tone: Normal     Skin   Skin and subcutaneous tissue: Normal without rashes or lesions  Palpation of skin and subcutaneous tissue: Normal turgor  Neurologic   Cranial nerves: Cranial nerves 2-12 intact  Cortical function: Normal mental status  Reflexes: 2+ and symmetric  Sensation: No sensory loss  Coordination: Normal finger to nose and heel to shin  Psychiatric   Judgment and insight: Normal     Orientation to person, place and time: Normal     Recent and remote memory: Intact  Mood and affect: Normal        Results/Data  PHQ-2 Adult Depression Screening 19Oct2016 02:06PM User, DeY[employer]     Test Name Result Flag Reference   PHQ-2 Adult Depression Score 0     Over the last two weeks, how often have you been bothered by any of the following problems? Little interest or pleasure in doing things: Not at all - 0  Feeling down, depressed, or hopeless: Not at all - 0   PHQ-2 Adult Depression Screening Negative       Falls Risk Assessment (Dx Z13 89 Screen for Neurologic Disorder) 04CYV3319 02:06PM User, [employer]     Test Name Result Flag Reference   Falls Risk      No falls in the past year       Health Management  Screening for colorectal cancer   COLONOSCOPY; every 10 years; Last 44JLY1637; Next Due: 34IYA7444;  Active    Future Appointments    Date/Time Provider Specialty Site   03/28/2017 10:00 AM Neeta Willett MD Surgical Oncology [facility]     Signatures   Electronically signed by : Kitty Varghese DO; Oct 19 2016  2:54PM EST (Author)

## 2018-01-17 NOTE — RESULT NOTES
Verified Results  (Q) PSA, TOTAL WITH REFLEX TO PSA, FREE 19Oct2016 02:55PM Piper Espinal   REPORT COMMENT:  FASTING:UNKNOWN     Test Name Result Flag Reference   TOTAL PSA 0 9 ng/mL  < OR = 4 0   This test was performed using the Yadi Abel  immunoassay method  Values obtained with other assay  methods cannot be used interchangeably  PSA levels,  regardless of value, should not be interpreted as  absolute evidence of the presence or absence of disease         Discussion/Summary   NORMAL PSA   DR Violetta Boudreaux

## 2018-01-18 NOTE — PROGRESS NOTES
Assessment    1  Viral URI (465 9) (J06 9)    Plan  Viral URI    · Avoid exposure to cigarette smoke ; Status:Complete;   Done: 33CZJ7347 01:45PM   · Drink at least 6 glasses of water or juice a day ; Status:Complete;   Done: 70EEO9560  01:45PM   · Good hand washing is one of the best ways to control the spread of germs ;  Status:Complete;   Done: 20GBZ5467 01:45PM   · The following home treatments may soothe a sore throat ; Status:Complete;   Done:  71ZLL7385 01:45PM   · Use a cough medicine to help you get adequate rest ; Status:Complete;   Done:  51BOZ8657 01:45PM    Discussion/Summary  Understands and agrees with treatment plan: The treatment plan was reviewed with the patient/guardian  The patient/guardian understands and agrees with the treatment plan   Follow Up Instructions: Follow Up with your Primary Care Provider in 4-5 days  If your symptoms worsen, go to the nearest [place] [address] Emergency Department  Chief Complaint  Chief Complaint Free Text Note Form: pt treated with 2 different abx for pneumonia  Fausto Larson again not feeling well  History of Present Illness  HPI: [facility] with a Z-pack  No improvement 1/20/16 so he went to PCP who gave Levaquin and a script for a CXR to be done when symptoms resolved  Symptoms resolved about 2 week ago  3 days ago, started with runny nose, PND and a dry cough  So Pt brought his CXR slip and had CXR this AM and wants to be seen for what he thinks is ongoing pneumonia  CXR obtained and [facility] Based Practices Required Assessment:   Abuse And Domestic Violence Screen    Yes, the patient is safe at home  The patient states no one is hurting them  Active Problems    1  Acute bronchitis (466 0) (J20 9)   2  Allergic contact dermatitis due to plants, except food (692 6) (L23 7)   3  Community acquired pneumonia (5) (J18 9)   4  Diabetes Mellitus (250 00)   5  Hypertension (401 9) (I10)   6   Lower Back Sprain (846 9)   7  Nocturia (788 43) (R35 1)   8  Spinal stenosis (724 00) (M48 00)    Past Medical History    1  History of sunburn (V13 3) (Z87 2)   2  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 60)  Active Problems And Past Medical History Reviewed: The active problems and past medical history were reviewed and updated today  Family History    1  Family history of Pacemaker Placement    2  Family history of Diabetes Mellitus (V18 0)  Family History Reviewed: The family history was reviewed and updated today  Social History    · Denied: History of Alcohol Use (History)   · Denied: History of Drug Use   · Former smoker (W17 32) (Y91 679)  Social History Reviewed: The social history was reviewed and updated today  Surgical History    1  History of Ankle Surgery  Surgical History Reviewed: The surgical history was reviewed and updated today  Current Meds   1  Accu-Chek FastClix Lancets Miscellaneous; Therapy: 22HCK9909 to Recorded   2  Aspirin 81 MG Oral Tablet; Therapy: (Recorded:10Mar2015) to Recorded   3  Avapro TABS; Therapy: (Recorded:10Mar2015) to Recorded   4  Levofloxacin 500 MG Oral Tablet; TAKE 1 TABLET DAILY AS DIRECTED; Therapy: 54MXE1571 to (Evaluate:30Jan2016)  Requested for: 30LQP0348; Last   Rx:20Jan2016 Ordered   5  MetFORMIN HCl - 1000 MG Oral Tablet; Take 1 tablet twice a day; Therapy: 57YTF3469 to (Evaluate:29Jun2015)  Requested for: 90EGW0078; Last   Rx:05Jan2013 Ordered   6  Victoza 18 MG/3ML SOLN;   Therapy: (Recorded:10Mar2015) to Recorded   7  Vitamin D 2000 UNIT Oral Tablet; Therapy: (Recorded:10Mar2015) to Recorded  Medication List Reviewed: The medication list was reviewed and updated today  Allergies    1  No Known Drug Allergies    2  No Known Environmental Allergies   3   No Known Food Allergies    Vitals  Signs [Data Includes: Current Encounter]   Recorded: 81KDC6482 01:22PM   Temperature: 99 3 F  Heart Rate: 95  Respiration: 18  Systolic: 223  Diastolic: 58  Height: 5 ft 9 in  Weight: 190 lb   BMI Calculated: 28 06  BSA Calculated: 2 02  O2 Saturation: 95  Pain Scale: 5    Physical Exam    Constitutional   General appearance: No acute distress, well appearing and well nourished  Ears, Nose, Mouth, and Throat   External inspection of ears and nose: Normal     Otoscopic examination: Tympanic membrance translucent with normal light reflex  Canals patent without erythema  Nasal mucosa, septum, and turbinates: Normal without edema or erythema  Oropharynx: Normal with no erythema, edema, exudate or lesions  Pulmonary   Respiratory effort: No increased work of breathing or signs of respiratory distress  Auscultation of lungs: Clear to auscultation  Cardiovascular   Auscultation of heart: Normal rate and rhythm, normal S1 and S2, without murmurs         Future Appointments    Date/Time Provider Specialty Site   03/29/2016 10:30 AM Kelsey Lopez MD Surgical Oncology [facility]     Signatures   Electronically signed by : TITA Quinones ; Feb 19 2016  1:46PM EST                       (Author)

## 2018-01-18 NOTE — MISCELLANEOUS
Assessment    1  Community acquired pneumonia (5) (J18 9)   2  Pulmonary nodule (793 11) (R91 1)    Plan  Community acquired pneumonia, Pulmonary nodule    · Follow-up PRN Evaluation and Treatment  Follow-up  Status: Complete  Done:  73PLR4916   Ordered; For: Community acquired pneumonia, Pulmonary nodule; Ordered By: Michael Kumar Performed:  Due: 64KZX1738   · * XR CHEST PA & LATERAL; Status:Active; Requested for:17Mar2017;    Perform:[facility]; Order Comments:70 yo male with recent LLL pneumonia 0 in need of post-treatment xray for clearance  Hx of RUL nodule known  Thanks  Sergio Barajas DO; Due:17Mar2018; Ordered; For:Community acquired pneumonia, Pulmonary nodule; Ordered By:Adalberto Milligan; Discussion/Summary  Discussion Summary:   Yoav Hercules is stable on exam - improved from the standpoint of his pneumonia  He is to f/u PRN  We will obtain a f/u chest xray for clearance that he will do next week  He is to f/u with Pulmonology at [facility] (and potentially CT Surgery as well) as planned; I wished him the best on his upcoming PET Scan  Counseling Documentation With Imm: The patient was counseled regarding instructions for management, impressions, importance of compliance with treatment  Medication SE Review and Pt Understands Tx: Possible side effects of new medications were reviewed with the patient/guardian today  The treatment plan was reviewed with the patient/guardian  The patient/guardian understands and agrees with the treatment plan      Chief Complaint  Chief Complaint Free Text Note Form: PT is being seen today for a hospital f/u DX with Pneumo  PT is feeling better  History of Present Illness  TCM Communication [facility]: The patient is being contacted for follow-up after hospitalization and 03/16/2017  Hospital records were reviewed and Pt was admitted to [facility] with LLL CAP, MS change, JAMILA  He was also found to have a spiculated lung nodule  Fevers to 104F   Was treated with Ceftriaxone and Azithromycin at first, and converted to PO Levaquin  Seen by Pulmonology and ID in the hospital  He was hospitalized at and [place] [id number]  The dates of hospitalization: 03/03/2017, date of admission: 03/03/2017, date of discharge: 03/04/2017  Diagnosis: PNEUMONIA  He was discharged to home  Medications reviewed and updated today  He scheduled a follow up appointment  Follow-up appointments with other specialists: Will have a PET Scan, and see Pulmonology at [facility]; as well as CT Surgery  Symptoms: no fever, no shortness of breath and no chest pain  Some residual cough, much better though  No rectal bleeding  Pt with chronic looser stools - no worsening  Counseling was provided to patient's family  WIFE  Topics counseled included diagnostic results, instructions for management and importance of compliance with treatment  Communication performed and completed by Brenton Izquierdo   HPI: As above  Review of Systems  Complete-Male:   Constitutional: as noted in HPI  Cardiovascular: as noted in HPI  Respiratory: as noted in HPI  Gastrointestinal: as noted in HPI  Active Problems    1  Diabetes Mellitus (250 00)   2  H/O malignant melanoma of skin (V10 82) (Z85 820)   3  Hypertension (401 9) (I10)   4  Lower Back Sprain (846 9)   5  Need for prophylactic vaccination against Streptococcus pneumoniae (pneumococcus)   (V03 82) (Z23)   6  Need for prophylactic vaccination and inoculation against influenza (V04 81) (Z23)   7  Nocturia (788 43) (R35 1)   8  Pain in joint of left shoulder (719 41) (M25 512)   9  Prostate cancer screening (V76 44) (Z12 5)   10  Screening for colorectal cancer (V76 51) (Z12 11,Z12 12)   11  Screening for diabetic retinopathy (V80 2) (Z13 5)   12  Spinal stenosis (724 00) (M48 00)   13  Well adult on routine health check (V70 0) (Z00 00)    Past Medical History    1  History of Acute bronchitis due to other specified organisms (466 0) (J20 8)   2   History of Allergic contact dermatitis due to plants, except food (692 6) (L23 7)   3  History of Community acquired pneumonia (5) (J18 9)   4  History of Cough (786 2) (R05)   5  History of acute bronchitis (V12 69) (Z87 09)   6  History of sunburn (V13 3) (Z87 2)   7  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 60)   8  History of Viral URI (465 9) (J06 9,B97 89)    Surgical History    1  History of Ankle Surgery    Family History  Father    1  Family history of Pacemaker Placement  Family History    2  Family history of Diabetes Mellitus (V18 0)    Social History    · Denied: History of Alcohol Use (History)   · Denied: History of Drug Use   · Former smoker (V15 82) (F37 707)    Current Meds   1  Accu-Chek FastClix Lancets Miscellaneous; Therapy: 02ZDE5117 to Recorded   2  Aspirin 81 MG TABS; Therapy: (Recorded:10Mar2015) to Recorded   3  Avapro TABS; Therapy: (Recorded:10Mar2015) to Recorded   4  BD Pen Needle Deanne U/F 32G X 4 MM Miscellaneous; Therapy: 28Qel4405 to Recorded   5  Irbesartan 150 MG Oral Tablet; Therapy: 82RXA6097 to Recorded   6  MetFORMIN HCl - 1000 MG Oral Tablet; Take 1 tablet twice a day; Therapy: 44JGG8154 to (Evaluate:14Oct2017)  Requested for: 19Oct2016 Recorded   7  Ventolin  (90 Base) MCG/ACT Inhalation Aerosol Solution; INHALE 2 PUFFS   EVERY 4-6 HOURS AS NEEDED; Therapy: 00WWX0558 to (Last Rx:03Oct2016)  Requested for: 99VKK6191 Ordered   8  Victoza 18 MG/3ML SOLN;   Therapy: (Recorded:10Mar2015) to Recorded   9  Vitamin D 2000 UNIT Oral Tablet; Therapy: (Recorded:10Mar2015) to Recorded    Allergies    1  No Known Drug Allergies    2  No Known Environmental Allergies   3   No Known Food Allergies    Vitals  Signs   Recorded: 45ECT5207 09:58AM   Temperature: 94 9 F  Heart Rate: 76  Respiration: 12  Systolic: 337  Diastolic: 50  Height: 5 ft 9 76 in  Weight: 185 lb 4 oz  BMI Calculated: 26 76  BSA Calculated: 2 02    Physical Exam    Constitutional   General appearance: No acute distress, well appearing and well nourished  NAD; pleasant; speaking in full sentences  Eyes   Conjunctiva and lids: No swelling, erythema, or discharge  Ears, Nose, Mouth, and Throat   Oropharynx: Normal with no erythema, edema, exudate or lesions  Pulmonary   Respiratory effort: No increased work of breathing or signs of respiratory distress  Auscultation of lungs: Abnormal   no rales or crackles were heard in the right lung, rales/crackles over the left base and Faint residual crackles at the LLB  no rhonchi  no friction rub  no wheezing  no diminished breath sounds  no bronchial breath sounds  Cardiovascular   Auscultation of heart: Normal rate and rhythm, normal S1 and S2, without murmurs  Lymphatic   Palpation of lymph nodes in neck: No lymphadenopathy  Musculoskeletal   Gait and station: Normal     Psychiatric   Orientation to person, place and time: Normal     Mood and affect: Normal          Health Management  Screening for colorectal cancer   COLONOSCOPY; every 10 years; Last 21WJT8266; Next Due: 62IXT0855;  Active    Future Appointments    Date/Time Provider Specialty Site   04/10/2017 11:00 AM Carol Monk MD Surgical Oncology [facility]   10/20/2017 10:00 AM Edgar Vogt DO Family Medicine [facility]     Signatures   Electronically signed by : Helen Palomo DO; Mar 17 2017  4:48PM EST                       (Author)

## 2018-01-22 VITALS
BODY MASS INDEX: 26.52 KG/M2 | HEIGHT: 70 IN | HEART RATE: 76 BPM | DIASTOLIC BLOOD PRESSURE: 50 MMHG | RESPIRATION RATE: 12 BRPM | WEIGHT: 185.25 LBS | SYSTOLIC BLOOD PRESSURE: 112 MMHG | TEMPERATURE: 94.9 F

## 2018-01-23 VITALS
RESPIRATION RATE: 14 BRPM | SYSTOLIC BLOOD PRESSURE: 130 MMHG | BODY MASS INDEX: 25.96 KG/M2 | HEART RATE: 80 BPM | TEMPERATURE: 98.3 F | OXYGEN SATURATION: 97 % | WEIGHT: 181.31 LBS | HEIGHT: 70 IN | DIASTOLIC BLOOD PRESSURE: 64 MMHG

## 2018-01-23 NOTE — PROGRESS NOTES
Assessment    1  Well adult on routine health check (V70 0) (Z00 00)   2  Need for prophylactic vaccination against Streptococcus pneumoniae (pneumococcus)   (V03 82) (Z23)   3  Need for prophylactic vaccination and inoculation against influenza (V04 81) (Z23)   4  Prostate cancer screening (V76 44) (Z12 5)    Plan  Need for prophylactic vaccination against Streptococcus pneumoniae (pneumococcus)    · Pneumo (Pneumovax)  Need for prophylactic vaccination and inoculation against influenza    · Fluzone High-Dose 0 5 ML Intramuscular Suspension Prefilled Syringe  Prostate cancer screening    · (Q) PSA, TOTAL WITH REFLEX TO PSA, FREE; Status:Active; Requested for:19Oct2016;   Well adult on routine health check    · Follow-up visit in 1 year Evaluation and Treatment  Follow-up  Status: Hold For -  Scheduling  Requested for: 52RDO5790   · Eat a low fat and low cholesterol diet ; Status:Complete;   Done: 88RRY1665   · There are many exercise options for seniors ; Status:Complete;   Done: 87BYI9019   · Use a sun block product with an SPF of 15 or more ; Status:Complete;   Done:  38ZEN7236   · Call [phone] if: You have any warning signs of skin cancer ; Status:Complete;    Done: 75JES6566   · Seek Immediate Medical Attention if: You experience a new kind of chest pain (angina)  or pressure ; Status:Complete;   Done: 77VIZ4852    Discussion/Summary  Impression: Initial Annual Wellness Visit, with preventive exam as well as age and risk appropriate counseling completed  Cardiovascular screening and counseling: screening is current  Diabetes screening and counseling: screening is current  Colorectal cancer screening and counseling: screening is current  Prostate cancer screening and counseling: the risks and benefits of screening were discussed  Osteoporosis screening and counseling: screening not indicated  Glaucoma screening and counseling: screening is current     HIV screening and counseling: the patient declines screening  Advance Directive Planning: complete and up to date  Advice and education were given regarding increasing physical activity  He was referred to endocrinology  Patient Discussion: plan discussed with the patient, follow-up visit needed in one year  Chief Complaint  Patient here for Medicare wellness exam      History of Present Illness  Welcome to Medicare and Wellness Visits: The patient is being seen for the initial annual wellness visit  Medicare Screening and Risk Factors   Hospitalizations: no previous hospitalizations  Once per lifetime medicare screening tests: ECG  Medicare Screening Tests Risk Questions   Abdominal aortic aneurysm risk assessment: none indicated  Osteoporosis risk assessment: none indicated  HIV risk assessment: none indicated  Drug and Alcohol Use: The patient is a former cigarette smoker  The patient reports occasional alcohol use  He has never used illicit drugs  Diet and Physical Activity: Current diet includes well balanced meals  He exercises daily  Exercise: walking  Mood Disorder and Cognitive Impairment Screening: He denies feeling down, depressed, or hopeless over the past two weeks  He denies feeling little interest or pleasure in doing things over the past two weeks  Cognitive impairment screening: denies difficulty learning/retaining new information, denies difficulty handling complex tasks, denies difficulty with reasoning, denies difficulty with spatial ability and orientation, denies difficulty with language and denies difficulty with behavior  Functional Ability/Level of Safety: Hearing is normal bilaterally  The patient is currently able to do activities of daily living without limitations   Activities of daily living details: does not need help using the phone, no transportation help needed, does not need help shopping, no meal preparation help needed, does not need help doing housework, does not need help doing laundry, does not need help managing medications and does not need help managing money  Fall risk factors:  polypharmacy and antihypertensive use, but no alcohol use, no mobility impairment, no antidepressant use, no deconditioning, no postural hypotension, no sedative use, no visual impairment, no urinary incontinence, no cognitive impairment, up and go test was normal and no previous fall  Home safety risk factors:  no unfamiliar surroundings, no loose rugs, no poor household lighting, no uneven floors, no household clutter, grab bars in the bathroom and handrails on the stairs  Advance Directives: Advance directives: living will, durable power of  for health care directives and advance directives  end of life decisions were reviewed with the patient  Co-Managers and Medical Equipment/Suppliers: See Patient Care Team      Patient Care Team    Care Team Member Role Specialty Office Number   [address] [facility]  Dermatology [phone]   Alvarado Strauss MD  Surgical Oncology [phone]     Review of Systems    Constitutional: negative  Head and Face: negative  Eyes: negative  ENT: negative  Cardiovascular: negative  Respiratory: negative  Gastrointestinal: negative  Genitourinary: negative  Musculoskeletal: negative  Integumentary and Breasts: negative  Neurological: negative  Psychiatric: negative  Endocrine: negative  Hematologic and Lymphatic: negative  Active Problems    1  Diabetes Mellitus (250 00)   2  H/O malignant melanoma of skin (V10 82) (Z85 820)   3  Hypertension (401 9) (I10)   4  Lower Back Sprain (846 9)   5  Nocturia (788 43) (R35 1)   6  Pain in joint of left shoulder (719 41) (M25 512)   7  Screening for colorectal cancer (V76 51) (Z12 11,Z12 12)   8   Screening for diabetic retinopathy (V80 2) (Z13 5)   9  Spinal stenosis (724 00) (M48 00)    Past Medical History    · History of Acute bronchitis due to other specified organisms (466 0) (J20 8)   · History of Allergic contact dermatitis due to plants, except food (692 6) (L23 7)   · History of Community acquired pneumonia (5) (J18 9)   · History of Cough (786 2) (R05)   · History of acute bronchitis (V12 69) (Z87 09)   · History of sunburn (V13 3) (Z87 2)   · 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 60)   · History of Viral URI (465 9) (J06 9,B97 89)    The active problems and past medical history were reviewed and updated today  Surgical History    · History of Ankle Surgery    The surgical history was reviewed and updated today  Family History  Father    · Family history of Pacemaker Placement  Family History    · Family history of Diabetes Mellitus (V18 0)    The family history was reviewed and updated today  Social History    · Denied: History of Alcohol Use (History)   · Denied: History of Drug Use   · Former smoker (W16 10) (G41 365)  The social history was reviewed and updated today  Current Meds   1  Accu-Chek FastClix Lancets Miscellaneous; Therapy: 17KIM1610 to Recorded   2  Aspirin 81 MG TABS; Therapy: (Recorded:10Mar2015) to Recorded   3  Avapro TABS; Therapy: (Recorded:10Mar2015) to Recorded   4  MetFORMIN HCl - 1000 MG Oral Tablet; Take 1 tablet twice a day; Therapy: 39TZJ5045 to (Evaluate:14Oct2017)  Requested for: 19Oct2016 Recorded   5  Ventolin  (90 Base) MCG/ACT Inhalation Aerosol Solution; INHALE 2 PUFFS   EVERY 4-6 HOURS AS NEEDED; Therapy: 41YLL2049 to (Last Rx:03Oct2016)  Requested for: 58CRB3661 Ordered   6  Victoza 18 MG/3ML SOLN;   Therapy: (Recorded:10Mar2015) to Recorded   7  Vitamin D 2000 UNIT Oral Tablet; Therapy: (Recorded:10Mar2015) to Recorded    The medication list was reviewed and updated today  Allergies    1  No Known Drug Allergies    2  No Known Environmental Allergies   3   No Known Food Allergies    Vitals  Signs    Systolic: 137  Diastolic: 60  Heart Rate: 72  Respiration: 18  Height: 5 ft 9 76 in  Weight: 182 lb 6 oz  BMI Calculated: 26 35  BSA Calculated: 2    Physical Exam    Constitutional   General appearance: No acute distress, well appearing and well nourished  Head and Face   Head and face: Normal     Eyes   Conjunctiva and lids: No erythema, swelling or discharge  Pupils and irises: Equal, round, reactive to light  Ears, Nose, Mouth, and Throat   External inspection of ears and nose: Normal     Otoscopic examination: Tympanic membranes translucent with normal light reflex  Canals patent without erythema  Hearing: Normal     Nasal mucosa, septum, and turbinates: Normal without edema or erythema  Lips, teeth, and gums: Normal, good dentition  Oropharynx: Normal with no erythema, edema, exudate or lesions  Neck   Neck: Supple, symmetric, trachea midline, no masses  Thyroid: Normal, no thyromegaly  Pulmonary   Respiratory effort: No increased work of breathing or signs of respiratory distress  Auscultation of lungs: Clear to auscultation  Cardiovascular   Auscultation of heart: Normal rate and rhythm, normal S1 and S2, no murmurs  Examination of extremities for edema and/or varicosities: Normal     Abdomen   Abdomen: Non-tender, no masses  Liver and spleen: No hepatomegaly or splenomegaly  Lymphatic   Palpation of lymph nodes in neck: No lymphadenopathy  Palpation of lymph nodes in axillae: No lymphadenopathy  Musculoskeletal   Gait and station: Normal     Inspection/palpation of digits and nails: Normal without clubbing or cyanosis  Inspection/palpation of joints, bones, and muscles: Normal     Range of motion: Normal     Stability: Normal     Skin   Skin and subcutaneous tissue: Normal without rashes or lesions  Palpation of skin and subcutaneous tissue: Normal turgor  Neurologic   Cranial nerves: Cranial nerves 2-12 intact  Cortical function: Normal mental status  Reflexes: 2+ and symmetric  Sensation: No sensory loss      Coordination: Normal finger to nose and heel to shin  Diabetic Foot Exam: Right Foot Findings: normal foot  The toes were normal  The sensory exam showed normal vibratory sensation at the level of the toes and normal position sense at the level of the toes  Left Foot Findings: normal foot  The toes were normal  The sensory exam showed normal vibratory sensation at the level of the toes and normal position sense at the level of the toes  Monofilament Testing: diminished tactile sensation with monofilament testing throughout both feet  Vascular: Pulses: 2+ in the posterior tibialis and 2+ in the dorsalis pedis  Pulses: 2+ in the posterior tibialis and 2+ in the dorsalis pedis  Assign Risk Category: 0: No loss of protective sensation, no deformity  No present risk  Right Foot Findings: dryness  The toes were normal  The sensory exam showed diminished vibratory sensation at the level of the toes  Left Foot Findings: dryness  The toes were normal  The sensory exam showed diminished vibratory sensation at the level of the toes  Monofilament Testing: normal tactile sensation with monofilament testing throughout both feet  Vascular: Pulses: 2+ in the posterior tibialis and 2+ in the dorsalis pedis  Pulses: 2+ in the posterior tibialis and 2+ in the dorsalis pedis  Assign Risk Category: 0: No loss of protective sensation, no deformity  No present risk  Results/Data  PHQ-2 Adult Depression Screening 19Oct2016 02:06PM User, Modern Message     Test Name Result Flag Reference   PHQ-2 Adult Depression Score 0     Over the last two weeks, how often have you been bothered by any of the following problems?   Little interest or pleasure in doing things: Not at all - 0  Feeling down, depressed, or hopeless: Not at all - 0   PHQ-2 Adult Depression Screening Negative       Falls Risk Assessment (Dx Z13 89 Screen for Neurologic Disorder) 97EWV0986 02:06PM User, Modern Message     Test Name Result Flag Reference   Falls Risk      No falls in the past year       Health Management  Screening for colorectal cancer   COLONOSCOPY; every 10 years; Last 23DER0548; Next Due: 57VWF4415;  Active    Future Appointments    Date/Time Provider Specialty Site   03/28/2017 10:00 AM Jessica Webb MD Surgical Oncology [facility]     Signatures   Electronically signed by : Keenan Casper DO; Oct 19 2016  3:00PM EST                       (Author)

## 2019-01-09 ENCOUNTER — OFFICE VISIT (OUTPATIENT)
Dept: SURGICAL ONCOLOGY | Facility: CLINIC | Age: 74
End: 2019-01-09
Payer: COMMERCIAL

## 2019-01-09 VITALS
TEMPERATURE: 97.8 F | DIASTOLIC BLOOD PRESSURE: 54 MMHG | HEIGHT: 70 IN | HEART RATE: 78 BPM | BODY MASS INDEX: 25.84 KG/M2 | RESPIRATION RATE: 18 BRPM | WEIGHT: 180.5 LBS | SYSTOLIC BLOOD PRESSURE: 100 MMHG

## 2019-01-09 DIAGNOSIS — Z85.820 HISTORY OF MELANOMA: Primary | ICD-10-CM

## 2019-01-09 PROCEDURE — 99213 OFFICE O/P EST LOW 20 MIN: CPT | Performed by: NURSE PRACTITIONER

## 2019-01-09 RX ORDER — VALACYCLOVIR HYDROCHLORIDE 500 MG/1
500 TABLET, FILM COATED ORAL DAILY
Refills: 3 | COMMUNITY
Start: 2018-12-18

## 2019-01-09 NOTE — PROGRESS NOTES
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  He underwent a wide excision by Dr Corey Canavan  He continues to follow with [facility] every 6 months  He underwent a RUL lung resection for lung cancer at [facility] in summer of 2017 and has recovered well  Denies headaches, back pain, bone pain, cough, abdominal pain  His shortness of breath is improving after his lung surgery  No worrisome findings on today's exam  Patient has requested an annual appt rather then twice a year, as he has many doctors appointments  Therefore, we will plan to see the patient back in 1 year or sooner if the need arises  He was instructed to call with any new concerns or symptoms  All of his questions were answered  History of Present Illness:        History of melanoma    2/4/2015 Biopsy     Left lateral forearm shave biopsy    Melanoma, 0 25 mm  Mitotic index: 0  Ulceration: not seen    At least Stage IA- pT1a, pNx         3/17/2015 Surgery     Wide excision (Dr Corey Canavan)    Prior biopsy site reaction; melanocytic hyperplasia             -Interval History:  Patient presents today for a 1 year follow-up visit for a left forearm melanoma diagnosed in 2015  He has no new complaints today  He reports no new concerns at his dermatology visit  Dermatologist: [facility], q 6 mo    Review of Systems:  Review of Systems   Constitutional: Negative for appetite change, chills, diaphoresis, fever and unexpected weight change     Respiratory: Positive for cough (chronic, unchanged) and shortness of breath (improving since lung surgery)  Negative for wheezing  Cardiovascular: Negative for chest pain, palpitations and leg swelling  Gastrointestinal: Negative for abdominal pain, diarrhea, nausea and vomiting  Musculoskeletal: Negative for arthralgias, back pain and myalgias  Skin: Negative for rash  Neurological: Negative for light-headedness and headaches  Hematological: Negative for adenopathy  Psychiatric/Behavioral: Negative for agitation and confusion  Patient Active Problem List   Diagnosis    Pulmonary nodule    Diabetes 1 5, managed as type 2 ([place] [address] )    Accelerated hypertension    History of melanoma    Community acquired pneumonia     Past Medical History:   Diagnosis Date    Arthritis     [facility] mellitus ([facility] )      Past Surgical History:   Procedure Laterality Date    SKIN BIOPSY       History reviewed  No pertinent family history  Social History     Social History    Marital status: /Civil Union     Spouse name: N/A    Number of children: N/A    Years of education: N/A     Occupational History    Not on file  Social History Main Topics    Smoking status: Former Smoker     Quit date: 12/3/2015    Smokeless tobacco: Former User      Comment: pt quit    Alcohol use No    Drug use: No    Sexual activity: Not on file     Other Topics Concern    Not on file     Social History Narrative    No narrative on file       Current Outpatient Prescriptions:     aspirin 81 MG tablet, Take 81 mg by mouth daily, Disp: , Rfl:     Cholecalciferol (VITAMIN D) 2000 UNITS CAPS, Take by mouth , Disp: , Rfl:     Irbesartan (AVAPRO PO), Take by mouth , Disp: , Rfl:     Liraglutide (VICTOZA SC), Inject 1 8 mg under the skin daily at bedtime  , Disp: , Rfl:     metFORMIN (GLUCOPHAGE) 1000 MG tablet, Take 1,000 mg by mouth 2 (two) times a day with meals  , Disp: , Rfl:     pravastatin (PRAVACHOL) 10 mg tablet, Take 10 mg by mouth daily, Disp: , Rfl:     valACYclovir (VALTREX) 500 mg tablet, Take 500 mg by mouth daily, Disp: , Rfl: 3  No Known Allergies  Vitals:    01/09/19 1039   BP: 100/54   Pulse: 78   Resp: 18   Temp: 97 8 °F (36 6 °C)       Physical Exam   Constitutional: He is oriented to person, place, and time  He appears well-developed and well-nourished  No distress  HENT:   Head: Normocephalic and atraumatic  Neck: Normal range of motion  Cardiovascular: Normal rate, regular rhythm and normal heart sounds  Pulmonary/Chest: Effort normal and breath sounds normal    Abdominal: Soft  Normal appearance  He exhibits no distension and no mass  There is no hepatosplenomegaly  There is no tenderness  Musculoskeletal: Normal range of motion  He exhibits no edema  Lymphadenopathy:     He has no axillary adenopathy  Right: No supraclavicular adenopathy present  Left: No supraclavicular adenopathy present  Neurological: He is alert and oriented to person, place, and time  Skin: Skin is warm and dry  No rash noted  Left forearm incision is well healed without skin changes or nodularities  No axillary lymphadenopathy noted  Psychiatric: He has a normal mood and affect  Vitals reviewed  Advance Care Planning/Advance Directives:  Discussed disease status, cancer treatment plans and/or cancer treatment goals with the patient

## 2020-01-21 PROBLEM — Z85.820 ENCOUNTER FOR FOLLOW-UP SURVEILLANCE OF MELANOMA: Status: ACTIVE | Noted: 2020-01-21

## 2020-01-21 PROBLEM — Z08 ENCOUNTER FOR FOLLOW-UP SURVEILLANCE OF MELANOMA: Status: ACTIVE | Noted: 2020-01-21

## 2020-01-22 ENCOUNTER — OFFICE VISIT (OUTPATIENT)
Dept: SURGICAL ONCOLOGY | Facility: CLINIC | Age: 75
End: 2020-01-22
Payer: COMMERCIAL

## 2020-01-22 VITALS
WEIGHT: 176 LBS | RESPIRATION RATE: 16 BRPM | HEIGHT: 70 IN | DIASTOLIC BLOOD PRESSURE: 60 MMHG | SYSTOLIC BLOOD PRESSURE: 110 MMHG | HEART RATE: 84 BPM | TEMPERATURE: 98.3 F | BODY MASS INDEX: 25.2 KG/M2

## 2020-01-22 DIAGNOSIS — Z85.820 ENCOUNTER FOR FOLLOW-UP SURVEILLANCE OF MELANOMA: Primary | ICD-10-CM

## 2020-01-22 DIAGNOSIS — R91.1 PULMONARY NODULE: ICD-10-CM

## 2020-01-22 DIAGNOSIS — Z08 ENCOUNTER FOR FOLLOW-UP SURVEILLANCE OF MELANOMA: Primary | ICD-10-CM

## 2020-01-22 PROCEDURE — 99213 OFFICE O/P EST LOW 20 MIN: CPT | Performed by: SURGERY

## 2020-01-22 RX ORDER — FINASTERIDE 5 MG/1
TABLET, FILM COATED ORAL
COMMUNITY
Start: 2019-10-24

## 2020-01-22 NOTE — LETTER
January 22, 2020     Beatriz Cu[address]ra, [address]  [address] [place] [address]    Patient: Gemini Duran   YOB: 1945   Date of Visit: 1/22/2020       Dear Dr Bee Reyes:    Thank you for referring Genevieve Franks to me for evaluation  Below are my notes for this consultation  If you have questions, please do not hesitate to call me  I look forward to following your patient along with you  Sincerely,        Theresa Humphreys MD        CC: EVAN Dykes MD Violeta Nao, MD  1/22/2020 11:22 AM  Sign at close encounter     Surgical Oncology Follow Up       [address] [facility] [address]  1945  [id number]  [address] [facility],[address]  [facility] [place]  [address] [facility] [address]    Chief Complaint   Patient presents with    Follow-up     1 year follow up  Assessment/Plan:    No problem-specific Assessment & Plan notes found for this encounter  Diagnoses and all orders for this visit:    Encounter for follow-up surveillance of melanoma    Pulmonary nodule    Other orders  -     finasteride (PROSCAR) 5 mg tablet        Advance Care Planning/Advance Directives:  Discussed disease status, cancer treatment plans and/or cancer treatment goals with the patient  History of melanoma    2/4/2015 Biopsy     Left lateral forearm shave biopsy    Melanoma, 0 25 mm  Mitotic index: 0  Ulceration: not seen    At least Stage IA- pT1a, pNx      3/17/2015 Surgery     Wide excision (Dr Noreen Dey)    Prior biopsy site reaction; melanocytic hyperplasia         History of Present Illness:  Patient is a 76-year-old man here for surveillance visit with no complaints report    He had a left forearm melanoma excised 5 years ago   -Interval History:  He had a recent biopsy of a left shoulder lesion done by Dr Dickson Zelaya, which was not melanoma  Review of Systems:  Review of Systems   Constitutional: Negative  HENT: Negative  Eyes: Negative  Respiratory: Negative  Cardiovascular: Negative  Gastrointestinal: Negative  Endocrine: Negative  Genitourinary: Negative  Musculoskeletal: Negative  Skin: Negative  Allergic/Immunologic: Negative  Neurological: Negative  Hematological: Negative  Psychiatric/Behavioral: Negative  Patient Active Problem List   Diagnosis    Pulmonary nodule    Diabetes 1 5, managed as type 2 ([place] [address] )    Accelerated hypertension    History of melanoma    Community acquired pneumonia    Encounter for follow-up surveillance of melanoma     Past Medical History:   Diagnosis Date    Arthritis     Diabetes mellitus ([place] [address] )      Past Surgical History:   Procedure Laterality Date    SKIN BIOPSY       No family history on file    Social History     Socioeconomic History    Marital status: /Civil Union     Spouse name: Not on file    Number of children: Not on file    Years of education: Not on file    Highest education level: Not on file   Occupational History    Not on file   Social Needs    Financial resource strain: Not on file    Food insecurity:     Worry: Not on file     Inability: Not on file    Transportation needs:     Medical: Not on file     Non-medical: Not on file   Tobacco Use    Smoking status: Former Smoker     Last attempt to quit: 12/3/2015     Years since quittin 1    Smokeless tobacco: Former User    Tobacco comment: pt quit   Substance and Sexual Activity    Alcohol use: No    Drug use: No    Sexual activity: Not on file   Lifestyle    Physical activity:     Days per week: Not on file     Minutes per session: Not on file    Stress: Not on file   Relationships    Social connections:     Talks on phone: Not on file     Gets together: Not on file     Attends Yazdanism service: Not on file     Active member of club or organization: Not on file     Attends meetings of clubs or organizations: Not on file     Relationship status: Not on file    Intimate partner violence:     Fear of current or ex partner: Not on file     Emotionally abused: Not on file     Physically abused: Not on file     Forced sexual activity: Not on file   Other Topics Concern    Not on file   Social History Narrative    Not on file       Current Outpatient Medications:     aspirin 81 MG tablet, Take 81 mg by mouth daily, Disp: , Rfl:     Cholecalciferol (VITAMIN D) 2000 UNITS CAPS, Take by mouth , Disp: , Rfl:     finasteride (PROSCAR) 5 mg tablet, , Disp: , Rfl:     Irbesartan (AVAPRO PO), Take by mouth , Disp: , Rfl:     Liraglutide (VICTOZA SC), Inject 1 8 mg under the skin daily at bedtime  , Disp: , Rfl:     metFORMIN (GLUCOPHAGE) 1000 MG tablet, Take 1,000 mg by mouth 2 (two) times a day with meals  , Disp: , Rfl:     pravastatin (PRAVACHOL) 10 mg tablet, Take 10 mg by mouth daily, Disp: , Rfl:     valACYclovir (VALTREX) 500 mg tablet, Take 500 mg by mouth daily, Disp: , Rfl: 3  No Known Allergies  Vitals:    01/22/20 1101   BP: 110/60   Pulse: 84   Resp: 16   Temp: 98 3 °F (36 8 °C)       Physical Exam   Constitutional: He is oriented to person, place, and time  He appears well-developed and well-nourished  HENT:   Head: Normocephalic and atraumatic  Right Ear: External ear normal    Left Ear: External ear normal    Eyes: Pupils are equal, round, and reactive to light  EOM are normal    Neck: Normal range of motion  Cardiovascular: Normal rate, regular rhythm and normal heart sounds  Pulmonary/Chest: Effort normal and breath sounds normal    Abdominal: Soft  Bowel sounds are normal    Musculoskeletal: Normal range of motion  Neurological: He is alert and oriented to person, place, and time  Skin: Skin is warm and dry  Left forearm excision site looks good without evidence of recurrence visible or palpable     Negative for cervical or axillary adenopathy  Psychiatric: He has a normal mood and affect  His behavior is normal  Judgment and thought content normal          Results:  Labs:  none    Imaging  No results found  I reviewed the above laboratory and imaging data  Discussion/Summary:  28-year-old man, status post excision of a left forearm melanoma 5 years ago  He is presently and ED  Plan of follow-up on a p r n  Basis    He will continue lifelong surveillance with Dr Catherine Weeks

## 2020-01-22 NOTE — PROGRESS NOTES
Surgical Oncology Follow Up       [address] [facility]  [facility]  [address] [facility] [address]    Chinyere Antoine  1945  [id number]  [id number] [facility]  [facility]  [address] [facility] [address]    Chief Complaint   Patient presents with    Follow-up     1 year follow up  Assessment/Plan:    No problem-specific Assessment & Plan notes found for this encounter  Diagnoses and all orders for this visit:    Encounter for follow-up surveillance of melanoma    Pulmonary nodule    Other orders  -     finasteride (PROSCAR) 5 mg tablet        Advance Care Planning/Advance Directives:  Discussed disease status, cancer treatment plans and/or cancer treatment goals with the patient  History of melanoma    2/4/2015 Biopsy     Left lateral forearm shave biopsy    Melanoma, 0 25 mm  Mitotic index: 0  Ulceration: not seen    At least Stage IA- pT1a, pNx      3/17/2015 Surgery     Wide excision (Dr Desmond Patterson)    Prior biopsy site reaction; melanocytic hyperplasia         History of Present Illness:  Patient is a 73-year-old man here for surveillance visit with no complaints report  He had a left forearm melanoma excised 5 years ago   -Interval History:  He had a recent biopsy of a left shoulder lesion done by Dr Caro Grover, which was not melanoma  Review of Systems:  Review of Systems   Constitutional: Negative  HENT: Negative  Eyes: Negative  Respiratory: Negative  Cardiovascular: Negative  Gastrointestinal: Negative  Endocrine: Negative  Genitourinary: Negative  Musculoskeletal: Negative  Skin: Negative  Allergic/Immunologic: Negative  Neurological: Negative  Hematological: Negative  Psychiatric/Behavioral: Negative          Patient Active Problem List   Diagnosis    Pulmonary nodule    Diabetes 1 5, managed as type 2 ([facility] [address] )    Accelerated hypertension    History of melanoma    Community acquired pneumonia    Encounter for follow-up surveillance of melanoma     Past Medical History:   Diagnosis Date    Arthritis     Diabetes mellitus ([facility] [address] )      Past Surgical History:   Procedure Laterality Date    SKIN BIOPSY       No family history on file    Social History     Socioeconomic History    Marital status: /Civil Union     Spouse name: Not on file    Number of children: Not on file    Years of education: Not on file    Highest education level: Not on file   Occupational History    Not on file   Social Needs    Financial resource strain: Not on file    Food insecurity:     Worry: Not on file     Inability: Not on file    Transportation needs:     Medical: Not on file     Non-medical: Not on file   Tobacco Use    Smoking status: Former Smoker     Last attempt to quit: 12/3/2015     Years since quittin 1    Smokeless tobacco: Former User    Tobacco comment: pt quit   Substance and Sexual Activity    Alcohol use: No    Drug use: No    Sexual activity: Not on file   Lifestyle    Physical activity:     Days per week: Not on file     Minutes per session: Not on file    Stress: Not on file   Relationships    Social connections:     Talks on phone: Not on file     Gets together: Not on file     Attends Restorationist service: Not on file     Active member of club or organization: Not on file     Attends meetings of clubs or organizations: Not on file     Relationship status: Not on file    Intimate partner violence:     Fear of current or ex partner: Not on file     Emotionally abused: Not on file     Physically abused: Not on file     Forced sexual activity: Not on file   Other Topics Concern    Not on file   Social History Narrative    Not on file       Current Outpatient Medications:     aspirin 81 MG tablet, Take 81 mg by mouth daily, Disp: , Rfl:     Cholecalciferol (VITAMIN D) 2000 UNITS CAPS, Take by mouth , Disp: , Rfl:     finasteride (PROSCAR) 5 mg tablet, , Disp: , Rfl:     Irbesartan (AVAPRO PO), Take by mouth , Disp: , Rfl:     Liraglutide (VICTOZA SC), Inject 1 8 mg under the skin daily at bedtime  , Disp: , Rfl:     metFORMIN (GLUCOPHAGE) 1000 MG tablet, Take 1,000 mg by mouth 2 (two) times a day with meals  , Disp: , Rfl:     pravastatin (PRAVACHOL) 10 mg tablet, Take 10 mg by mouth daily, Disp: , Rfl:     valACYclovir (VALTREX) 500 mg tablet, Take 500 mg by mouth daily, Disp: , Rfl: 3  No Known Allergies  Vitals:    01/22/20 1101   BP: 110/60   Pulse: 84   Resp: 16   Temp: 98 3 °F (36 8 °C)       Physical Exam   Constitutional: He is oriented to person, place, and time  He appears well-developed and well-nourished  HENT:   Head: Normocephalic and atraumatic  Right Ear: External ear normal    Left Ear: External ear normal    Eyes: Pupils are equal, round, and reactive to light  EOM are normal    Neck: Normal range of motion  Cardiovascular: Normal rate, regular rhythm and normal heart sounds  Pulmonary/Chest: Effort normal and breath sounds normal    Abdominal: Soft  Bowel sounds are normal    Musculoskeletal: Normal range of motion  Neurological: He is alert and oriented to person, place, and time  Skin: Skin is warm and dry  Left forearm excision site looks good without evidence of recurrence visible or palpable     Negative for cervical or axillary adenopathy  Psychiatric: He has a normal mood and affect  His behavior is normal  Judgment and thought content normal          Results:  Labs:  none    Imaging  No results found  I reviewed the above laboratory and imaging data  Discussion/Summary:  71-year-old man, status post excision of a left forearm melanoma 5 years ago  He is presently and ED  Plan of follow-up on a p r n  Basis    He will continue lifelong surveillance with Dr Shad Bello

## 2021-02-13 DIAGNOSIS — Z23 ENCOUNTER FOR IMMUNIZATION: ICD-10-CM

## 2022-02-16 ENCOUNTER — TELEPHONE (OUTPATIENT)
Dept: OTHER | Facility: OTHER | Age: 77
End: 2022-02-16

## 2022-02-17 ENCOUNTER — NURSING HOME VISIT (OUTPATIENT)
Dept: GERIATRICS | Facility: OTHER | Age: 77
End: 2022-02-17
Payer: COMMERCIAL

## 2022-02-17 DIAGNOSIS — I95.1 ORTHOSTATIC HYPOTENSION: ICD-10-CM

## 2022-02-17 DIAGNOSIS — J69.0 ASPIRATION PNEUMONIA, UNSPECIFIED ASPIRATION PNEUMONIA TYPE, UNSPECIFIED LATERALITY, UNSPECIFIED PART OF LUNG (HCC): Primary | ICD-10-CM

## 2022-02-17 DIAGNOSIS — R26.2 AMBULATORY DYSFUNCTION: ICD-10-CM

## 2022-02-17 DIAGNOSIS — U07.1 COVID-19: ICD-10-CM

## 2022-02-17 DIAGNOSIS — E13.9 DIABETES 1.5, MANAGED AS TYPE 2 (HCC): ICD-10-CM

## 2022-02-17 PROCEDURE — 99306 1ST NF CARE HIGH MDM 50: CPT | Performed by: FAMILY MEDICINE

## 2022-02-17 NOTE — PROGRESS NOTES
Marai [address]  [address] [facility] [address] [facility], [address] [facility] [address]  History and Physical    NAME: Maria Del Carmen Harp  AGE: 77 y o  SEX: male [id number]    DATE OF ENCOUNTER: 2/17/2022    Code status:  CPR    Assessment and Plan     1  Aspiration pneumonia, unspecified aspiration pneumonia type, unspecified laterality, unspecified part of lung ([facility] )  - improving  - s/p abx    2  COVID-19  - improved, not on oxygen  - monitor for oxygen sats    3  Orthostatic hypotension  - stable  - cont with abdominal binder and stockings  - cont Midodrine 10 mg po tid    4  Ambulatory dysfunction  - PT/OT ordered  - Fall precautions in place    5  Diabetes 1 5, managed as type 2 ([facility] )  - cont Metformin 1 gm po bid  - cont accuchecks as ordered  - cont pravastatin 10 mg po qhs      All medications and routine orders were reviewed and updated as needed  Plan discussed with:  Patient and staff    Chief Complaint     Seen for admission at [address] [place] [address] Ave    History of Present Illness     Justa Justin, a 78 y/o male with past medical history of HTN, DM2, anemia, got admitted to the hospital with aspiration pneumonia  He was started on Ceftriaxone, flagyl and steroids  His hospital course was complicated by acute toxic metabolic encephalopathy, likely due to delirium or COVID pneumonia or hypoxia  He was evaluated by speech, recommended regular diet  He developed orthostatic hypotension and his antihypertensives were discontinued, treated with gentle IV fluids and midodrine  His overall condition improved  Therapy evaluated and recommended rehab  He was discharged to [address] [place] [address] for subacute rehab  He was seen and examined at bedside, stable  He is able to give good history  He lives at home with his wife, independent of ADLs, does not use any assistive devices  He is complaining of generalized weakness, has no other complaints at this time    Staff have no complaints at this time     HISTORY:  Past Medical History:   Diagnosis Date    Arthritis     Diabetes mellitus ([facility] [address] [facility] )      No family history on file  Social History     Socioeconomic History    Marital status: /Civil Union     Spouse name: Not on file    Number of children: Not on file    Years of education: Not on file    Highest education level: Not on file   Occupational History    Not on file   Tobacco Use    Smoking status: Former Smoker     Quit date: 12/3/2015     Years since quittin 2    Smokeless tobacco: Former User    Tobacco comment: pt quit   Substance and Sexual Activity    Alcohol use: No    Drug use: No    Sexual activity: Not on file   Other Topics Concern    Not on file   Social History Narrative    Not on file     Social Determinants of Health     Financial Resource Strain: Not on file   Food Insecurity: Not on file   Transportation Needs: Not on file   Physical Activity: Not on file   Stress: Not on file   Social Connections: Not on file   Intimate Partner Violence: Not on file   Housing Stability: Not on file       Allergies:  No Known Allergies    Review of Systems     Review of Systems   Constitutional: Positive for activity change and fatigue  HENT: Negative for dental problem, hearing loss and trouble swallowing  Eyes: Negative  Respiratory: Negative  Cardiovascular: Negative  Gastrointestinal: Negative  Genitourinary: Negative  Musculoskeletal: Positive for gait problem  Negative for arthralgias  Neurological: Positive for weakness  Psychiatric/Behavioral: Negative  All other systems reviewed and are negative  As in HPI  Medications and orders     All medications reviewed and updated in Nursing Home EMR  Objective     Vitals: T: 97 3, P: 58, R: 16, BP: 114/64, 95% on RA, Wt: 143 5 lbs    Physical Exam  Vitals and nursing note reviewed  Constitutional:       General: He is not in acute distress  Appearance: Normal appearance   He is well-developed  He is not diaphoretic  HENT:      Head: Normocephalic and atraumatic  Nose: Nose normal       Mouth/Throat:      Mouth: Mucous membranes are moist       Pharynx: Oropharynx is clear  No oropharyngeal exudate  Eyes:      General: No scleral icterus  Right eye: No discharge  Left eye: No discharge  Extraocular Movements: Extraocular movements intact  Conjunctiva/sclera: Conjunctivae normal    Cardiovascular:      Rate and Rhythm: Normal rate and regular rhythm  Heart sounds: Normal heart sounds  No murmur heard  Pulmonary:      Effort: Pulmonary effort is normal  No respiratory distress  Breath sounds: Normal breath sounds  No wheezing  Chest:      Chest wall: No tenderness  Abdominal:      General: Bowel sounds are normal  There is no distension  Palpations: Abdomen is soft  Tenderness: There is no abdominal tenderness  There is no guarding  Musculoskeletal:         General: No tenderness or deformity  Normal range of motion  Cervical back: Normal range of motion and neck supple  Right lower leg: No edema  Left lower leg: No edema  Skin:     General: Skin is warm and dry  Neurological:      Mental Status: He is alert and oriented to person, place, and time  Cranial Nerves: No cranial nerve deficit  Motor: No abnormal muscle tone  Coordination: Coordination normal    Psychiatric:         Mood and Affect: Mood normal          Behavior: Behavior normal          Pertinent Laboratory/Diagnostic Studies: The following labs/studies were reviewed please see chart or hospital paperwork for details    Ref Range & Units 2/16/22 0315    Glucose 65 - 99 mg/dL 116 High     BUN 7 - 28 mg/dL 18    Creatinine 0 53 - 1 30 mg/dL 1 19    Sodium 135 - 145 mmol/L 138    Potassium 3 5 - 5 2 mmol/L 4 6    Chloride 100 - 109 mmol/L 104    Carbon Dioxide 23 - 31 mmol/L 24    Calcium 8 5 - 10 1 mg/dL 8 9    Albumin 3 5 - 4 8 g/dL 2 4 Low     Phosphorus 2 3 - 4 6 mg/dL 4 2    Anion Gap 3 - 11 10    eGFR, Non-African American >60 GFR not calculated due to lack of steady state  eGFR,  >60 GFR not calculated due to lack of steady state        Ref Range & Units 2/15/22 1248    Hematocrit 37 0 - 48 0 % 26 9 Low     Hemoglobin 12 5 - 17 0 g/dL 9 1 Low         - Counseling Documentation: patient was counseled regarding: risk factor reductions

## 2022-02-18 PROBLEM — I95.1 ORTHOSTATIC HYPOTENSION: Status: ACTIVE | Noted: 2022-02-18

## 2022-02-18 PROBLEM — U07.1 COVID-19: Status: ACTIVE | Noted: 2022-02-18

## 2022-02-18 PROBLEM — J69.0 ASPIRATION PNEUMONIA (HCC): Status: ACTIVE | Noted: 2022-02-18

## 2022-02-18 PROBLEM — R26.2 AMBULATORY DYSFUNCTION: Status: ACTIVE | Noted: 2022-02-18

## 2022-02-21 ENCOUNTER — NURSING HOME VISIT (OUTPATIENT)
Dept: GERIATRICS | Facility: OTHER | Age: 77
End: 2022-02-21
Payer: COMMERCIAL

## 2022-02-21 VITALS
SYSTOLIC BLOOD PRESSURE: 135 MMHG | OXYGEN SATURATION: 97 % | BODY MASS INDEX: 20.59 KG/M2 | WEIGHT: 143.5 LBS | HEART RATE: 98 BPM | TEMPERATURE: 97.9 F | DIASTOLIC BLOOD PRESSURE: 61 MMHG | RESPIRATION RATE: 18 BRPM

## 2022-02-21 DIAGNOSIS — J69.0 ASPIRATION PNEUMONIA, UNSPECIFIED ASPIRATION PNEUMONIA TYPE, UNSPECIFIED LATERALITY, UNSPECIFIED PART OF LUNG (HCC): Primary | ICD-10-CM

## 2022-02-21 DIAGNOSIS — R26.2 AMBULATORY DYSFUNCTION: ICD-10-CM

## 2022-02-21 DIAGNOSIS — I95.1 ORTHOSTATIC HYPOTENSION: ICD-10-CM

## 2022-02-21 DIAGNOSIS — K29.70 GASTRITIS WITHOUT BLEEDING, UNSPECIFIED CHRONICITY, UNSPECIFIED GASTRITIS TYPE: ICD-10-CM

## 2022-02-21 DIAGNOSIS — E13.9 DIABETES 1.5, MANAGED AS TYPE 2 (HCC): ICD-10-CM

## 2022-02-21 DIAGNOSIS — U07.1 COVID-19: ICD-10-CM

## 2022-02-21 PROCEDURE — 99316 NF DSCHRG MGMT 30 MIN+: CPT | Performed by: NURSE PRACTITIONER

## 2022-02-21 RX ORDER — MIDODRINE HYDROCHLORIDE 10 MG/1
10 TABLET ORAL 3 TIMES DAILY
Qty: 90 TABLET | Refills: 0 | Status: SHIPPED | OUTPATIENT
Start: 2022-02-21

## 2022-02-21 RX ORDER — MIDODRINE HYDROCHLORIDE 10 MG/1
10 TABLET ORAL 3 TIMES DAILY
COMMUNITY
End: 2022-02-21 | Stop reason: SDUPTHER

## 2022-02-21 RX ORDER — PANTOPRAZOLE SODIUM 40 MG/1
40 TABLET, DELAYED RELEASE ORAL DAILY
COMMUNITY
End: 2022-02-21 | Stop reason: SDUPTHER

## 2022-02-21 RX ORDER — PANTOPRAZOLE SODIUM 40 MG/1
40 TABLET, DELAYED RELEASE ORAL DAILY
Qty: 30 TABLET | Refills: 0 | Status: SHIPPED | OUTPATIENT
Start: 2022-02-21 | End: 2022-02-25

## 2022-02-21 NOTE — ASSESSMENT & PLAN NOTE
· At baseline patient lives at home with his wife, he is independent with ADLs and does not use any assistive devices   · He had a prolonged hospitalization and became deconditioned  · Assist with ADls as needed  · Fall Precautions   · Patient and wife requesting early discharge from SNF states she will be able to manage him at home as he has significantly improved  · Medically he is stable for discharge  · If ok with therapy and  ok to d/c   · Spoke with  - he will d/c home with home health PT/OT/SN services tomorrow   · Spoke with wife who is in agreement with home health PT/OT/SN services

## 2022-02-21 NOTE — ASSESSMENT & PLAN NOTE
Lab Results   Component Value Date    HGBA1C 5 6 11/15/2021     · Per geriatric guidelines, goal HgA1c is > 7 5 to prevent hypoglycemic event in the older adult with cognitive decline  · Continue Metformin 1000mg PO BID  · Continue Pravastatin 10 mg PO Q HS   · Continue Accuchecks

## 2022-02-21 NOTE — ASSESSMENT & PLAN NOTE
· Tested Positive at home on 1/12/22  · Had been treated with codeine cough syrup at home   · Presented to ED with confusion and asp   pneumonia  · Treated with IV abx and steroids  · Now he is doing welll on room air

## 2022-02-21 NOTE — PROGRESS NOTES
[facility]  [address]  [phone]  [facility] [place]  Code 31    NAME: Cipriano Espino  AGE: 77 y o  SEX: male   CODE STATUS: Full Code    DATE OF ADMISSION: 2/16/2022   DATE OF DISCHARGE: 2/22/2022   DISCHARGE DISPOSITION: Stable for discharge to home with family support and home health PT/OT/SN services  Reason for Admission: Patient was admitted to [facility] for rehabilitation after hospitalization for confusion/aspiration  Past Medical and Surgical History:   Past Medical History:   Diagnosis Date    Arthritis     Diabetes mellitus ([address] )       Past Surgical History:   Procedure Laterality Date    SKIN BIOPSY         Course of stay:   Ti Barraza is a 77year old male admitted to [facility] on 2/16/2022 for STR  Past Medical Hx including but not limited to DM, Pnuemonia, Orthostatic hypotension, COVID-19, Hx of Melanoma, ambulatory dysfunction  seen in collaboration with nursing for medical mgmt and Discharge visit  Hospital course:  Presented to [facility] on 1/28/2022 from home with confusion  Reportedly patient was diagnosed with a recent COVID infection on 1/12/2022 and was managing at home had been prescribed a codeine cough syrup and presented with an aspiration event and confusion  CT of the chest concerning for aspiration pneumonia with debris in the right bronchi S  he was treated with IV ceftriaxone and IV Flagyl as well as steroids  He had a complicated hospitalization due to acute toxic metabolic encephalopathy requiring a psychiatric evaluation  He was diagnosed with delirium likely due to COVID pneumonia, aspiration pneumonia and hypoxia  CT of the head unremarkable  Speech therapy evaluated him with a video swallow study which was negative for aspiration at the time and was recommended a regular diet  Slowly his mental status improved and he was weaned off of the Seroquel    Patient developed orthostatic blood pressures and his antihypertensives were discontinued and he was started on an abdominal binder and compression stockings as well as midodrine  He was also treated with IV fluids  He was discharged on midodrine 10 mg t i d  incidentally on CT imaging of his chest gastritis was found in he was started on a PPI with recommendation to follow-up with GI as outpatient  Patient had become deconditioned due to his prolonged hospitalization with immobility and confusion  He was evaluated by PT/OT he was eventually was able to ambulate without any shortness of breath but due to his deconditioning short-term rehab was recommended  Rehab:  Seen and examined at bedside today  Patient is a reliable historian  He is AAOx3 on exam  His wife, Josh Antoine is present and helps support and provide history  Patient is resting comfortably in bed  He states he "feel great"  Patient and wife are requesting to go home  Patient denies cough,shortness of breath, chest pain/palpitations  Denies N/V/D  States he is moving his bowels without difficulty and fully emptying his bladder  He states he does not like the food here and his wife is bringing in food for him  He was seen by therapy who state he is ambulating with RW without difficulty, patient denies dizziness or syncope  Per review of SNF records, Patient is eating 1-2 meals per day, consuming 25 to 100 %  Last documented BM was 2/20/2022  Patient is actively participating in therapy  No concerns from nursing at this time  Patient is medically stable for discharge from SNF  Recommend home health PT/OT/SN, wife and patient in agreement   made aware of patient and wife request for discharge to home  ROS:  Review of Systems   Constitutional: Negative for activity change, appetite change, fatigue and fever  HENT: Negative for ear pain, rhinorrhea and trouble swallowing  Eyes: Negative for pain and visual disturbance     Respiratory: Negative for cough, shortness of breath and wheezing  Cardiovascular: Negative for chest pain and palpitations  Gastrointestinal: Negative for abdominal distention, abdominal pain, blood in stool, constipation, diarrhea, nausea and vomiting  Genitourinary: Negative for decreased urine volume, difficulty urinating, dysuria, frequency and hematuria  Musculoskeletal: Negative for arthralgias, back pain and gait problem  Skin: Negative for color change and rash  Neurological: Negative for dizziness, syncope, weakness, light-headedness, numbness and headaches  Psychiatric/Behavioral: Negative for dysphoric mood and sleep disturbance  PHYSICAL EXAM:  VITALS:   Vitals:    02/21/22 1423   BP: 135/61   Pulse: 98   Resp: 18   Temp: 97 9 °F (36 6 °C)   SpO2: 97%        Physical Exam  Vitals and nursing note reviewed  Constitutional:       General: He is not in acute distress  Appearance: Normal appearance  HENT:      Head: Normocephalic and atraumatic  Nose: No congestion or rhinorrhea  Mouth/Throat:      Mouth: Mucous membranes are moist    Eyes:      General: No scleral icterus  Extraocular Movements: Extraocular movements intact  Conjunctiva/sclera: Conjunctivae normal       Pupils: Pupils are equal, round, and reactive to light  Cardiovascular:      Rate and Rhythm: Normal rate and regular rhythm  Pulses: Normal pulses  Heart sounds: Normal heart sounds  No murmur heard  Pulmonary:      Effort: Pulmonary effort is normal       Breath sounds: Normal breath sounds  No wheezing, rhonchi or rales  Abdominal:      General: Bowel sounds are normal  There is no distension  Palpations: Abdomen is soft  Tenderness: There is no abdominal tenderness  Musculoskeletal:         General: No swelling or signs of injury  Lymphadenopathy:      Cervical: No cervical adenopathy  Skin:     General: Skin is warm and dry  Findings: No erythema  Neurological:      Mental Status: He is alert and oriented to person, place, and time  Sensory: No sensory deficit  Motor: No weakness  Gait: Gait normal    Psychiatric:         Mood and Affect: Mood normal          Behavior: Behavior normal          Admission Diagnoses:   1  Aspiration pneumonia, unspecified aspiration pneumonia type, unspecified laterality, unspecified part of lung [address] [address] )  Assessment & Plan:  · Likely from lethargy s/p covid   · Improving on room air  · S/p IV abx while inpatient   · Lungs diminished to Right base on exam - otherwise clear      2  COVID-19  Assessment & Plan:  · Tested Positive at home on 1/12/22  · Had been treated with codeine cough syrup at home   · Presented to ED with confusion and asp  pneumonia  · Treated with IV abx and steroids  · Now he is doing welll on room air        3  Orthostatic hypotension  Assessment & Plan:  · Continue Midodrine 10 mg TID  · Denies dizziness/lightheadedness     Orders:  -     midodrine (PROAMATINE) 10 MG tablet; Take 1 tablet (10 mg total) by mouth 3 (three) times a day    4  Ambulatory dysfunction  Assessment & Plan:  · At baseline patient lives at home with his wife, he is independent with ADLs and does not use any assistive devices   · He had a prolonged hospitalization and became deconditioned  · Assist with ADls as needed  · Fall Precautions   · Patient and wife requesting early discharge from SNF states she will be able to manage him at home as he has significantly improved  · Medically he is stable for discharge  · If ok with therapy and  ok to d/c   · Spoke with  - he will d/c home with home health PT/OT/SN services tomorrow   · Spoke with wife who is in agreement with home health PT/OT/SN services         5   Diabetes 1 5, managed as type 2 [facility])  Assessment & Plan:    Lab Results   Component Value Date    HGBA1C 5 6 11/15/2021     · Per geriatric guidelines, goal HgA1c is > 7 5 to prevent hypoglycemic event in the older adult with cognitive decline  · Continue Metformin 1000mg PO BID  · Continue Pravastatin 10 mg PO Q HS   · Continue Accuchecks       6  Gastritis without bleeding, unspecified chronicity, unspecified gastritis type  Assessment & Plan:  · Found during recent hospitalization and started on Protonix   · Patient denies Heartburn/GERD symptoms on exam  · States he did have some vomiting prior but this has resolved- denies blood in vomit   · States he does not like the food here at [facility]  · Explained to wife, Axel Alexander there can be many causes to gastritis and recommend continue Protonix as prescribed while inpatient and follow up as Outpatient with Gastroenterology   · Continue Protonix 40 mg PO daily     Orders:  -     pantoprazole (PROTONIX) 40 mg tablet; Take 1 tablet (40 mg total) by mouth daily       Follow-up Recommendations:     Outpatient Follow up with PCP in the next 2 weeks  [address] [place] [address] PT/OT/SN services   [facility] Medicine 5/23/22   [facility] Endocrinology 6/6/2022   [facility] Urology 8/17/2022    Labs and testing performed during stay:   None     Discharge Medications: See discharge medication list which was reviewed and signed  Current Outpatient Medications:     midodrine (PROAMATINE) 10 MG tablet, Take 1 tablet (10 mg total) by mouth 3 (three) times a day, Disp: 90 tablet, Rfl: 0    pantoprazole (PROTONIX) 40 mg tablet, Take 1 tablet (40 mg total) by mouth daily, Disp: 30 tablet, Rfl: 0    aspirin 81 MG tablet, Take 81 mg by mouth daily, Disp: , Rfl:     Cholecalciferol (VITAMIN D) 2000 UNITS CAPS, Take by mouth , Disp: , Rfl:     finasteride (PROSCAR) 5 mg tablet, , Disp: , Rfl:     Irbesartan (AVAPRO PO), Take by mouth , Disp: , Rfl:     Liraglutide (VICTOZA SC), Inject 1 8 mg under the skin daily at bedtime  , Disp: , Rfl:     metFORMIN (GLUCOPHAGE) 1000 MG tablet, Take 1,000 mg by mouth 2 (two) times a day with meals  , Disp: , Rfl:     pravastatin (PRAVACHOL) 10 mg tablet, Take 10 mg by mouth daily, Disp: , Rfl:     valACYclovir (VALTREX) 500 mg tablet, Take 500 mg by mouth daily, Disp: , Rfl: 3     Discussion with patient/family and further instructions:  -Fall precautions  -Aspiration precautions  -Bleeding precautions  -Monitor for signs/symptoms of infection  -Medication list was reviewed and updated    Status at time of discharge: Stable    Plan discussed with Dr Prema Mcpherson noted agreement with assessment and plan  Billing based on time  Time spent on unit, 40 minutes  Time spent counseling pt on debility/condition, 30 minutes  Please note:  Voice-recognition software may have been used in the preparation of this document  Occasional wrong word or "sound-alike" substitutions may have occurred due to the inherent limitations of voice recognition software  Interpretation should be guided by context          EVAN Siegel  2/21/2022

## 2022-02-21 NOTE — PROGRESS NOTES
[facility]  [address]  [phone]  [place]  Code 31 (STR)        NAME: Constance Markham  AGE: 77 y o  SEX: male CODE STATUS: Full Code    DATE OF ENCOUNTER: 2/21/2022    Assessment and Plan     1  Ambulatory dysfunction  Assessment & Plan:  · At baseline patient lives at home with his wife, he is independent with ADLs and does not use any assistive devices   · He had a prolonged hospitalization and became deconditioned  · He will continue PT/OT at SNF  · Assist with ADls as needed  · Fall Precautions   ·  following for d/c planning         2  COVID-19  Assessment & Plan:  · Tested Positive at home on 1/12/22  · Had been treated with codeine cough syrup at home   · Presented to ED with confusion and asp  pneumonia  · Treated with IV abx and steroids  · Now he is doing welll on room air        3  Orthostatic hypotension  Assessment & Plan:  · Continue Midodrine 10 mg TID  · Denies dizziness/lightheadedness       4  Aspiration pneumonia, unspecified aspiration pneumonia type, unspecified laterality, unspecified part of lung ([facility] [address] )  Assessment & Plan:  · Likely from lethargy s/p covid   · Improving on room air  · S/p IV abx while inpatient       5  Diabetes 1 5, managed as type 2 [facility])  Assessment & Plan:    Lab Results   Component Value Date    HGBA1C 5 6 11/15/2021     · Per geriatric guidelines, goal HgA1c is > 7 5 to prevent hypoglycemic event in the older adult with cognitive decline  · Continue Metformin 1000mg PO BID  · Continue Pravastatin 10 mg PO Q HS   · Continue Accuchecks          All medications and routine orders were reviewed and updated as needed      Chief Complaint     STR f/u visit     Past Medical and Surgica History      Past Medical History:   Diagnosis Date    Arthritis     Diabetes mellitus ([address] )      Past Surgical History:   Procedure Laterality Date    SKIN BIOPSY       No Known Allergies       History of Present Illness     Jessica Carranza is a 77year old male admitted to [address] [place] [address] on 2/16/2022 for STR  Past Medical Hx including but not limited to DM, Pnuemonia, Orthostatic hypotension, COVID-19, Hx of Melanoma, ambulatory dysfunction  seen in collaboration with nursing for medical mgmt and STR follow up  Hospital course:  Presented to [facility] on 1/28/2022 from home with confusion  Reportedly patient was diagnosed with a recent COVID infection on 1/12/2022 and was managing at home had been prescribed a codeine cough syrup and presented with an aspiration event and confusion  CT of the chest concerning for aspiration pneumonia with debris in the right bronchi S  he was treated with IV ceftriaxone and IV Flagyl as well as steroids  He had a complicated hospitalization due to acute toxic metabolic encephalopathy requiring a psychiatric evaluation  He was diagnosed with delirium likely due to COVID pneumonia, aspiration pneumonia and hypoxia  CT of the head unremarkable  Speech therapy evaluated him with a video swallow study which was negative for aspiration at the time and was recommended a regular diet  Slowly his mental status improved and he was weaned off of the Seroquel  Patient developed orthostatic blood pressures and his antihypertensives were discontinued and he was started on an abdominal binder and compression stockings as well as midodrine  He was also treated with IV fluids  He was discharged on midodrine 10 mg t i d  incidentally on CT imaging of his chest gastritis was found in he was started on a PPI with recommendation to follow-up with GI as outpatient  Patient had become deconditioned due to his prolonged hospitalization with immobility and confusion  He was evaluated by PT/OT he was eventually was able to ambulate without any shortness of breath but due to his deconditioning short-term rehab was recommended  Rehab:    Seen and examined at bedside today   Patient is a reliable historian/ is a poor historian due to ***  Denies CP/SOB/N/V/D  Denies lightheadedness, dizziness, headaches, vision changes  Patient states they are eating well and staying hydrated  Denies any bowel or bladder issues  Per review of SNF records, Patient is eating 1-2 meals per day, consuming 25 to 100 %  Last documented BM was 2/20/2022  Patient is actively participating in therapy  No concerns from nursing at this time  The patient's allergies, past medical, surgical, social and family history were reviewed and unchanged  Review of Systems     Review of Systems   Constitutional: Negative for activity change, appetite change, fatigue and fever  HENT: Negative for ear pain, rhinorrhea and trouble swallowing  Eyes: Negative for pain and visual disturbance  Respiratory: Negative for cough, shortness of breath and wheezing  Cardiovascular: Negative for chest pain and palpitations  Gastrointestinal: Negative for abdominal distention, abdominal pain, blood in stool, constipation, diarrhea, nausea and vomiting  Genitourinary: Negative for decreased urine volume, difficulty urinating, dysuria, frequency and hematuria  Musculoskeletal: Negative for arthralgias, back pain and gait problem  Skin: Negative for color change and rash  Neurological: Negative for dizziness, syncope, weakness, light-headedness, numbness and headaches  Psychiatric/Behavioral: Negative for dysphoric mood and sleep disturbance  Objective     Vitals: There were no vitals filed for this visit  Physical Exam  Vitals and nursing note reviewed  Constitutional:       General: He is not in acute distress  Appearance: Normal appearance  HENT:      Head: Normocephalic and atraumatic  Nose: No congestion or rhinorrhea  Mouth/Throat:      Mouth: Mucous membranes are moist    Eyes:      General: No scleral icterus  Extraocular Movements: Extraocular movements intact        Conjunctiva/sclera: Conjunctivae normal       Pupils: Pupils are equal, round, and reactive to light  Cardiovascular:      Rate and Rhythm: Normal rate and regular rhythm  Pulses: Normal pulses  Heart sounds: Normal heart sounds  No murmur heard  Pulmonary:      Effort: Pulmonary effort is normal       Breath sounds: Normal breath sounds  No wheezing, rhonchi or rales  Abdominal:      General: Bowel sounds are normal  There is no distension  Palpations: Abdomen is soft  Tenderness: There is no abdominal tenderness  Musculoskeletal:         General: No swelling or signs of injury  Lymphadenopathy:      Cervical: No cervical adenopathy  Skin:     General: Skin is warm and dry  Findings: No erythema  Neurological:      Mental Status: He is alert and oriented to person, place, and time  Sensory: No sensory deficit  Motor: No weakness  Gait: Gait normal    Psychiatric:         Mood and Affect: Mood normal          Behavior: Behavior normal          Pertinent Laboratory/Diagnostic Studies:   Reviewed in facility chart-stable      Current Medications   Medications reviewed and updated see facility [facility] for details  Current Outpatient Medications:     aspirin 81 MG tablet, Take 81 mg by mouth daily, Disp: , Rfl:     Cholecalciferol (VITAMIN D) 2000 UNITS CAPS, Take by mouth , Disp: , Rfl:     finasteride (PROSCAR) 5 mg tablet, , Disp: , Rfl:     Irbesartan (AVAPRO PO), Take by mouth , Disp: , Rfl:     Liraglutide (VICTOZA SC), Inject 1 8 mg under the skin daily at bedtime  , Disp: , Rfl:     metFORMIN (GLUCOPHAGE) 1000 MG tablet, Take 1,000 mg by mouth 2 (two) times a day with meals  , Disp: , Rfl:     pravastatin (PRAVACHOL) 10 mg tablet, Take 10 mg by mouth daily, Disp: , Rfl:     valACYclovir (VALTREX) 500 mg tablet, Take 500 mg by mouth daily, Disp: , Rfl: 3     Plan discussed with Dr Delia Rendon noted agreement with assessment and plan        Please note: Voice-recognition software may have been used in the preparation of this document  Occasional wrong word or "sound-alike" substitutions may have occurred due to the inherent limitations of voice recognition software  Interpretation should be guided by context           EVAN Guerin  2/21/2022  8:47 AM

## 2022-02-21 NOTE — ASSESSMENT & PLAN NOTE
· Likely from lethargy s/p covid   · Improving on room air  · S/p IV abx while inpatient   · Lungs diminished to Right base on exam - otherwise clear

## 2022-02-21 NOTE — ASSESSMENT & PLAN NOTE
· Found during recent hospitalization and started on Protonix   · Patient denies Heartburn/GERD symptoms on exam  · States he did have some vomiting prior but this has resolved- denies blood in vomit   · States he does not like the food here at SNF  · Explained to wife, Anu Aquilino there can be many causes to gastritis and recommend continue Protonix as prescribed while inpatient and follow up as Outpatient with Gastroenterology   · Continue Protonix 40 mg PO daily

## 2022-10-11 PROBLEM — J69.0 ASPIRATION PNEUMONIA (HCC): Status: RESOLVED | Noted: 2022-02-18 | Resolved: 2022-10-11

## 2023-07-10 ENCOUNTER — TELEPHONE (OUTPATIENT)
Dept: HEMATOLOGY ONCOLOGY | Facility: CLINIC | Age: 78
End: 2023-07-10

## 2023-07-10 ENCOUNTER — DOCUMENTATION (OUTPATIENT)
Dept: HEMATOLOGY ONCOLOGY | Facility: CLINIC | Age: 78
End: 2023-07-10

## 2023-07-10 NOTE — PROGRESS NOTES
Intake received, chart reviewed for need of external records.   Telephone call to [facility] requesting information regarding biopsy for patient's melanoma diagnosis  Spoke with Lidya Anguiano [phone]    Pathology requested:   From  via fax , phone     Images requested:  From  via fax , phone     Routed to consulting providers team.

## 2023-07-10 NOTE — TELEPHONE ENCOUNTER
Spoke to patient's wife Gurinder Yates and made appt for him to see Dr. Valentina Childers. Message sent to [facility] to finish intake process.

## 2023-07-10 NOTE — TELEPHONE ENCOUNTER
Call Transfer   Who are you speaking with? Spouse   If it is not the patient, are they listed on an active communication consent form? Yes   Who is the patients HemOnc/SurgOnc provider? Dr. Kelly Hogan   What is the reason for this call? Luh Bautista calling in stating the patient's dermatologist has found new spots and biopsied them, and they came back as early stage melanoma. Luh Bautista is wanting to know if Dr Kelly Hogan can do the procedure instead of the dermatologist.    Person/Department that the call was transferred to? Time that call was transferred? Todd Corado @ 8:44AM   Your call will be transferred now. If you receive a voicemail, please leave a detailed message and a member of the team will return your call as soon as possible. Did you relay this information to the caller?   Yes

## 2023-07-10 NOTE — TELEPHONE ENCOUNTER
Liliana calling back in requesting to be transferred again.  I transferred to [address] [place] [address] @ 10:56AM

## 2023-07-13 ENCOUNTER — DOCUMENTATION (OUTPATIENT)
Dept: HEMATOLOGY ONCOLOGY | Facility: CLINIC | Age: 78
End: 2023-07-13

## 2023-07-13 NOTE — PROGRESS NOTES
Intake received, chart reviewed for need of external records.     Pathology requested:   From [facility]  via fax [phone], phone     Images requested:  From n/a via fax , phone     Routed to consulting providers team.

## 2023-07-14 ENCOUNTER — TELEPHONE (OUTPATIENT)
Dept: HEMATOLOGY ONCOLOGY | Facility: CLINIC | Age: 78
End: 2023-07-14

## 2023-07-14 NOTE — TELEPHONE ENCOUNTER
Spoke to patient's wife and answered all questions concerning his upcoming appt with Dr. Joseph Camara.

## 2023-07-14 NOTE — TELEPHONE ENCOUNTER
Call Transfer   Who are you speaking with? Spouse   If it is not the patient, are they listed on an active communication consent form? N/A   Who is the patients HemOnc/SurgOnc provider? Dr. Cami Drummond   What is the reason for this call? Darryl Mancini is requesting to speak to Zuleika about the patient. Person/Department that the call was transferred to? Time that call was transferred? Chad Manuel @ 9:12AM    Your call will be transferred now. If you receive a voicemail, please leave a detailed message and a member of the team will return your call as soon as possible. Did you relay this information to the caller?   Yes

## 2023-07-18 ENCOUNTER — DOCUMENTATION (OUTPATIENT)
Dept: HEMATOLOGY ONCOLOGY | Facility: CLINIC | Age: 78
End: 2023-07-18

## 2023-07-18 NOTE — PROGRESS NOTES
Records received from outside facility. Outside Pathology/Images records received from :     Records sent to Pathology attention Barbara Aguilera.  Note routed to team.

## 2023-07-20 ENCOUNTER — LAB REQUISITION (OUTPATIENT)
Dept: LAB | Facility: HOSPITAL | Age: 78
End: 2023-07-20
Payer: COMMERCIAL

## 2023-07-20 DIAGNOSIS — Z85.820 PERSONAL HISTORY OF MALIGNANT MELANOMA OF SKIN: ICD-10-CM

## 2023-07-20 PROCEDURE — 88321 CONSLTJ&REPRT SLD PREP ELSWR: CPT | Performed by: STUDENT IN AN ORGANIZED HEALTH CARE EDUCATION/TRAINING PROGRAM

## 2023-07-21 PROBLEM — D23.9 DYSPLASTIC NEVUS: Status: ACTIVE | Noted: 2023-07-21

## 2023-07-26 ENCOUNTER — CONSULT (OUTPATIENT)
Dept: SURGICAL ONCOLOGY | Facility: CLINIC | Age: 78
End: 2023-07-26
Payer: COMMERCIAL

## 2023-07-26 VITALS
HEART RATE: 80 BPM | BODY MASS INDEX: 21.81 KG/M2 | DIASTOLIC BLOOD PRESSURE: 68 MMHG | WEIGHT: 152 LBS | SYSTOLIC BLOOD PRESSURE: 140 MMHG | OXYGEN SATURATION: 100 % | TEMPERATURE: 97.3 F

## 2023-07-26 DIAGNOSIS — D23.9 DYSPLASTIC NEVUS: Primary | ICD-10-CM

## 2023-07-26 PROCEDURE — 99203 OFFICE O/P NEW LOW 30 MIN: CPT | Performed by: SURGERY

## 2023-07-26 RX ORDER — FLUCONAZOLE 100 MG/1
TABLET ORAL
COMMUNITY
Start: 2023-05-14

## 2023-07-26 RX ORDER — ASCORBIC ACID 500 MG
500 TABLET ORAL DAILY
COMMUNITY

## 2023-07-26 NOTE — LETTER
July 26, 2023     Janeth Mejía [facility] [address] [facility]  [address] [address]    Patient: Zain Palacio   YOB: 1945   Date of Visit: 7/26/2023       Dear Dr. Ross Lomas:    Thank you for referring Evans Leach to me for evaluation. Below are my notes for this consultation. If you have questions, please do not hesitate to call me. I look forward to following your patient along with you. Sincerely,        Josafat Rust MD        CC: EVAN Holder MD Suezanne Man, MD  7/26/2023  2:21 PM  Sign when Signing Visit               Surgical Oncology Consult       [address] [facility] [place] [id number] [phone]    Zain Palacio  1945  [id number]  [address] [facility]  [address] [place] [address]  [phone]    Chief Complaint   Patient presents with   • Consult       Assessment/Plan:    No problem-specific Assessment & Plan notes found for this encounter. Diagnoses and all orders for this visit:    Dysplastic nevus    Other orders  -     ascorbic acid (VITAMIN C) 500 MG tablet; Take 500 mg by mouth daily  -     cyanocobalamin (VITAMIN B-12) 1000 MCG tablet; Take 1,000 mcg by mouth daily  -     Epoetin Juve (PROCRIT IJ); Inject as directed once a week. -     fluconazole (DIFLUCAN) 100 mg tablet       Advance Care Planning/Advance Directives:  Discussed disease status, cancer treatment plans and/or cancer treatment goals with the patient.      Oncology History   History of melanoma   2/4/2015 Biopsy    Left lateral forearm shave biopsy    Melanoma, 0.25 mm  Mitotic index: 0  Ulceration: not seen    At least Stage IA- pT1a, pNx     3/17/2015 Surgery    Wide excision (Dr. Darby Lo)    Prior biopsy site reaction; melanocytic hyperplasia     Dysplastic nevus   7/20/2023 Biopsy    Consult case (12 slides [id number] [facility], collected 6/22/2023):     Skin, right paraspinal back T3, shave biopsy:     Lentiginous compound dysplastic nevus with severe atypia; approaching peripheral specimen margin    Skin, right mid back T5, shave biopsy:     Lentiginous compound dysplastic nevus with severe atypia; extending to peripheral specimen margin            History of Present Illness: Patient is a 52-year-old man with a history of excessive sun exposure as a child. He was recently found to have 2 moles on his back of interest.  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 Review of Systems   Constitutional: Negative. HENT: Negative. Eyes: Negative. Respiratory: Negative. Cardiovascular: Negative. Gastrointestinal: Negative. Endocrine: Negative. Genitourinary: Negative. Musculoskeletal: Negative. Skin: Negative. Allergic/Immunologic: Negative. Neurological: Negative. Hematological: Negative. Psychiatric/Behavioral: Negative. All other systems reviewed and are negative. Patient Active Problem List   Diagnosis   • Pulmonary nodule   • Diabetes 1.5, managed as type 2 ([address] [facility])   • Accelerated hypertension   • History of melanoma   • Community acquired pneumonia   • Encounter for follow-up surveillance of melanoma   • COVID-19   • Orthostatic hypotension   • Ambulatory dysfunction   • Gastritis   • Dysplastic nevus     Past Medical History:   Diagnosis Date   • Arthritis    • Diabetes mellitus ([address])      Past Surgical History:   Procedure Laterality Date   • SKIN BIOPSY       No family history on file.   Social History     Socioeconomic History   • Marital status: /Civil Union     Spouse name: Not on file   • Number of children: Not on file   • Years of education: Not on file   • Highest education level: Not on file   Occupational History   • Not on file   Tobacco Use   • Smoking status: Former Types: Cigarettes     Quit date: 12/3/2015     Years since quittin.6   • Smokeless tobacco: Former   • Tobacco comments:     pt quit   Substance and Sexual Activity   • Alcohol use: No   • Drug use: No   • Sexual activity: Not on file   Other Topics Concern   • Not on file   Social History Narrative   • Not on file     Social Determinants of Health     Financial Resource Strain: Not on file   Food Insecurity: Not on file   Transportation Needs: Not on file   Physical Activity: Not on file   Stress: Not on file   Social Connections: Not on file   Intimate Partner Violence: Not on file   Housing Stability: Not on file       Current Outpatient Medications:   •  ascorbic acid (VITAMIN C) 500 MG tablet, Take 500 mg by mouth daily, Disp: , Rfl:   •  aspirin 81 MG tablet, Take 81 mg by mouth daily, Disp: , Rfl:   •  Cholecalciferol (VITAMIN D) 2000 UNITS CAPS, Take by mouth., Disp: , Rfl:   •  cyanocobalamin (VITAMIN B-12) 1000 MCG tablet, Take 1,000 mcg by mouth daily, Disp: , Rfl:   •  Epoetin Juve (PROCRIT IJ), Inject as directed once a week., Disp: , Rfl:   •  finasteride (PROSCAR) 5 mg tablet, , Disp: , Rfl:   •  fluconazole (DIFLUCAN) 100 mg tablet, , Disp: , Rfl:   •  Irbesartan (AVAPRO PO), Take by mouth., Disp: , Rfl:   •  metFORMIN (GLUCOPHAGE) 1000 MG tablet, Take 1,000 mg by mouth 2 (two) times a day with meals. , Disp: , Rfl:   •  midodrine (PROAMATINE) 10 MG tablet, Take 1 tablet (10 mg total) by mouth 3 (three) times a day (Patient taking differently: Take 2.5 mg by mouth 3 (three) times a day), Disp: 90 tablet, Rfl: 0  •  pantoprazole (PROTONIX) 40 mg tablet, TAKE 1 TABLET BY MOUTH EVERY DAY, Disp: 30 tablet, Rfl: 0  •  pravastatin (PRAVACHOL) 10 mg tablet, Take 10 mg by mouth daily, Disp: , Rfl:   •  valACYclovir (VALTREX) 500 mg tablet, Take 500 mg by mouth daily, Disp: , Rfl: 3  •  Liraglutide (VICTOZA SC), Inject 1.8 mg under the skin daily at bedtime   (Patient not taking: Reported on 2023), Disp: , Rfl:   No Known Allergies  Vitals:    07/26/23 1329   BP: 140/68   Pulse: 80   Temp: (!) 97.3 °F (36.3 °C)   SpO2: 100%       Physical Exam  Vitals reviewed. Constitutional:       Appearance: Normal appearance. HENT:      Head: Normocephalic and atraumatic. Right Ear: External ear normal.      Left Ear: External ear normal.   Eyes:      Extraocular Movements: Extraocular movements intact. Pupils: Pupils are equal, round, and reactive to light. Cardiovascular:      Rate and Rhythm: Normal rate and regular rhythm. Heart sounds: Normal heart sounds. Pulmonary:      Effort: Pulmonary effort is normal.      Breath sounds: Normal breath sounds. Abdominal:      General: Abdomen is flat. Palpations: Abdomen is soft. Musculoskeletal:         General: Normal range of motion. Cervical back: Normal range of motion and neck supple. Skin:     General: Skin is warm and dry. Comments: 2 well-healed scabs right upper and mid back. No satellite lesions. No adenopathy. Neurological:      General: No focal deficit present. Mental Status: He is alert and oriented to person, place, and time. Psychiatric:         Mood and Affect: Mood normal.         Behavior: Behavior normal.         Thought Content:  Thought content normal.         Judgment: Judgment normal.         Pathology:  Case Report   Surgical Pathology Report                         Case: [id number]                                    Authorizing Provider:  Sandra Lewis MD        Collected:           07/20/2023 0701               Ordering Location:     [facility]      Received:            07/20/2023 [address] [facility]                                                                                   Laboratory                                                                    Pathologist:           Luisa Wang MD Specimen:    Skin, Other, Right paraspinal back T3, Right mid back T5 shave biopsy (12 slides                     [id number] [facility], collected 6/22/2023)                       Final Diagnosis   Consult case (12 slides [id number] [facility], collected 6/22/2023):     A. Skin, right paraspinal back T3, shave biopsy:     Lentiginous compound dysplastic nevus with severe atypia; approaching peripheral specimen margin (see note). B. Skin, right mid back T5, shave biopsy:     Lentiginous compound dysplastic nevus with severe atypia; extending to peripheral specimen margin (see note). Note: The provided SOX10 and PRAME immunostains were reviewed; the lesional cells are positive for both immunostains. Electronically signed by Jossy Sarah MD on 7/20/2023 at  [facility] [address]:  none    Imaging  CT CHEST WO CONTRAST    Result Date: 7/1/2023  Narrative: History: History of smoking, cancer   Exam: CT of the chest.   Technique: Using helical technique, unenhanced axial images were obtained through the chest. Coronal and sagittal reformats and were obtained. Comparison: Multiple studies including 1/20/2022   Chest CT. Lungs/Pleura: Central airways are patent aside from mild secretions. Emphysema with scattered foci of mild atelectasis and scar. Status post right upper lobectomy. Previously seen extensive confluent patchy airspace disease in the right lung has resolved. There is a new 9 mm left upper lobe nodule as well as a new subpleural 4 mm nodule (series 303, images 57, 26). Central 6 mm left lower lobe nodule (image 78) not definitively seen previously as well. No pleural effusion or pneumothorax. Mediastinum/Lymph nodes: No lymphadenopathy by size criteria Heart/Vessels: Stable heart size. Aortic and coronary calcifications.  No pericardial effusion Chest Wall: No abnormal soft tissue or lymphadenopathy by size criteria  Visualized abdomen:No acute abnormality identified. Cholelithiasis noted Bones:No aggressive lesion    Impression: Impression: Postsurgical changes in the right lung with resolution of previously have is extensive right lung airspace disease. There are new left lung nodules measuring up to 9 mm, as described, for which short-term follow-up recommended CT examination performed with dose lowering protocol in accordance with ERROL. Workstation:[id number]    I reviewed the above laboratory and imaging data. 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 We will plan on an office excision at his convenience. All questions answered.

## 2023-07-26 NOTE — PROGRESS NOTES
Surgical Oncology Consult       [address] [facility]  [address] [place] [address] [phone]    India Carvalho  1945  [id number]  [address] [facility] SURGICAL ONCOL[facility]Y Donahuelionel Cervantes  [address] [facility],[address] [place] [address] [phone]    Chief Complaint   Patient presents with   • Consult       Assessment/Plan:    No problem-specific Assessment & Plan notes found for this encounter. Diagnoses and all orders for this visit:    Dysplastic nevus    Other orders  -     ascorbic acid (VITAMIN C) 500 MG tablet; Take 500 mg by mouth daily  -     cyanocobalamin (VITAMIN B-12) 1000 MCG tablet; Take 1,000 mcg by mouth daily  -     Epoetin Juve (PROCRIT IJ); Inject as directed once a week. -     fluconazole (DIFLUCAN) 100 mg tablet        Advance Care Planning/Advance Directives:  Discussed disease status, cancer treatment plans and/or cancer treatment goals with the patient. Oncology History   History of melanoma   2/4/2015 Biopsy    Left lateral forearm shave biopsy    Melanoma, 0.25 mm  Mitotic index: 0  Ulceration: not seen    At least Stage IA- pT1a, pNx     3/17/2015 Surgery    Wide excision (Dr. Epi Gentile)    Prior biopsy site reaction; melanocytic hyperplasia     Dysplastic nevus   7/20/2023 Biopsy    Consult case (12 slides [id number] [facility], collected 6/22/2023):     Skin, right paraspinal back T3, shave biopsy:     Lentiginous compound dysplastic nevus with severe atypia; approaching peripheral specimen margin    Skin, right mid back T5, shave biopsy:     Lentiginous compound dysplastic nevus with severe atypia; extending to peripheral specimen margin            History of Present Illness: Patient is a 78-year-old man with a history of excessive sun exposure as a child.   He was recently found to have 2 moles on his back of interest.  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 Review of Systems   Constitutional: Negative. HENT: Negative. Eyes: Negative. Respiratory: Negative. Cardiovascular: Negative. Gastrointestinal: Negative. Endocrine: Negative. Genitourinary: Negative. Musculoskeletal: Negative. Skin: Negative. Allergic/Immunologic: Negative. Neurological: Negative. Hematological: Negative. Psychiatric/Behavioral: Negative. All other systems reviewed and are negative. Patient Active Problem List   Diagnosis   • Pulmonary nodule   • Diabetes 1.5, managed as type 2 ([address] [facility])   • Accelerated hypertension   • History of melanoma   • Community acquired pneumonia   • Encounter for follow-up surveillance of melanoma   • COVID-19   • Orthostatic hypotension   • Ambulatory dysfunction   • Gastritis   • Dysplastic nevus     Past Medical History:   Diagnosis Date   • Arthritis    • Diabetes mellitus ([address])      Past Surgical History:   Procedure Laterality Date   • SKIN BIOPSY       No family history on file.   Social History     Socioeconomic History   • Marital status: /Civil Union     Spouse name: Not on file   • Number of children: Not on file   • Years of education: Not on file   • Highest education level: Not on file   Occupational History   • Not on file   Tobacco Use   • Smoking status: Former     Types: Cigarettes     Quit date: 12/3/2015     Years since quittin.6   • Smokeless tobacco: Former   • Tobacco comments:     pt quit   Substance and Sexual Activity   • Alcohol use: No   • Drug use: No   • Sexual activity: Not on file   Other Topics Concern   • Not on file   Social History Narrative   • Not on file     Social Determinants of Health     Financial Resource Strain: Not on file   Food Insecurity: Not on file   Transportation Needs: Not on file   Physical Activity: Not on file   Stress: Not on file   Social Connections: Not on file   Intimate Partner Violence: Not on file   Housing Stability: Not on file       Current Outpatient Medications:   •  ascorbic acid (VITAMIN C) 500 MG tablet, Take 500 mg by mouth daily, Disp: , Rfl:   •  aspirin 81 MG tablet, Take 81 mg by mouth daily, Disp: , Rfl:   •  Cholecalciferol (VITAMIN D) 2000 UNITS CAPS, Take by mouth., Disp: , Rfl:   •  cyanocobalamin (VITAMIN B-12) 1000 MCG tablet, Take 1,000 mcg by mouth daily, Disp: , Rfl:   •  Epoetin Juve (PROCRIT IJ), Inject as directed once a week., Disp: , Rfl:   •  finasteride (PROSCAR) 5 mg tablet, , Disp: , Rfl:   •  fluconazole (DIFLUCAN) 100 mg tablet, , Disp: , Rfl:   •  Irbesartan (AVAPRO PO), Take by mouth., Disp: , Rfl:   •  metFORMIN (GLUCOPHAGE) 1000 MG tablet, Take 1,000 mg by mouth 2 (two) times a day with meals. , Disp: , Rfl:   •  midodrine (PROAMATINE) 10 MG tablet, Take 1 tablet (10 mg total) by mouth 3 (three) times a day (Patient taking differently: Take 2.5 mg by mouth 3 (three) times a day), Disp: 90 tablet, Rfl: 0  •  pantoprazole (PROTONIX) 40 mg tablet, TAKE 1 TABLET BY MOUTH EVERY DAY, Disp: 30 tablet, Rfl: 0  •  pravastatin (PRAVACHOL) 10 mg tablet, Take 10 mg by mouth daily, Disp: , Rfl:   •  valACYclovir (VALTREX) 500 mg tablet, Take 500 mg by mouth daily, Disp: , Rfl: 3  •  Liraglutide (VICTOZA SC), Inject 1.8 mg under the skin daily at bedtime   (Patient not taking: Reported on 7/26/2023), Disp: , Rfl:   No Known Allergies  Vitals:    07/26/23 1329   BP: 140/68   Pulse: 80   Temp: (!) 97.3 °F (36.3 °C)   SpO2: 100%       Physical Exam  Vitals reviewed. Constitutional:       Appearance: Normal appearance. HENT:      Head: Normocephalic and atraumatic. Right Ear: External ear normal.      Left Ear: External ear normal.   Eyes:      Extraocular Movements: Extraocular movements intact. Pupils: Pupils are equal, round, and reactive to light. Cardiovascular:      Rate and Rhythm: Normal rate and regular rhythm. Heart sounds: Normal heart sounds. Pulmonary:      Effort: Pulmonary effort is normal.      Breath sounds: Normal breath sounds. Abdominal:      General: Abdomen is flat. Palpations: Abdomen is soft. Musculoskeletal:         General: Normal range of motion. Cervical back: Normal range of motion and neck supple. Skin:     General: Skin is warm and dry. Comments: 2 well-healed scabs right upper and mid back. No satellite lesions. No adenopathy. Neurological:      General: No focal deficit present. Mental Status: He is alert and oriented to person, place, and time. Psychiatric:         Mood and Affect: Mood normal.         Behavior: Behavior normal.         Thought Content: Thought content normal.         Judgment: Judgment normal.         Pathology:  Case Report   Surgical Pathology Report                         Case: [id number]                                    Authorizing Provider: Josafat Rust MD        Collected:           07/20/2023 0701               Ordering Location:     [address]      Received:            07/20/2023 0702                                      [facility]                                                                                   Laboratory                                                                    Pathologist:           Jose Manuel Puentes MD                                                            Specimen:    Skin, Other, Right paraspinal back T3, Right mid back T5 shave biopsy (12 slides                     [id number] [facility], collected 6/22/2023)                       Final Diagnosis   Consult case (12 slides [id number] [email or web address], collected 6/22/2023):     A. Skin, right paraspinal back T3, shave biopsy:     Lentiginous compound dysplastic nevus with severe atypia; approaching peripheral specimen margin (see note).       B.  Skin, right mid back T5, shave biopsy:     Lentiginous compound dysplastic nevus with severe atypia; extending to peripheral specimen margin (see note).     Note: The provided SOX10 and PRAME immunostains were reviewed; the lesional cells are positive for both immunostains.         Electronically signed by Carmen Recio MD on 7/20/2023 at  5:02 PM         Labs:  none    Imaging  CT CHEST WO CONTRAST    Result Date: 7/1/2023  Narrative: History: History of smoking, cancer   Exam: CT of the chest.   Technique: Using helical technique, unenhanced axial images were obtained through the chest. Coronal and sagittal reformats and were obtained.   Comparison: Multiple studies including 1/20/2022   Chest CT. Lungs/Pleura: Central airways are patent aside from mild secretions. Emphysema with scattered foci of mild atelectasis and scar. Status post right upper lobectomy. Previously seen extensive confluent patchy airspace disease in the right lung has resolved. There is a new 9 mm left upper lobe nodule as well as a new subpleural 4 mm nodule (series 303, images 57, 26). Central 6 mm left lower lobe nodule (image 78) not definitively seen previously as well. No pleural effusion or pneumothorax. Mediastinum/Lymph nodes: No lymphadenopathy by size criteria Heart/Vessels: Stable heart size. Aortic and coronary calcifications. No pericardial effusion Chest Wall: No abnormal soft tissue or lymphadenopathy by size criteria  Visualized abdomen:No acute abnormality identified. Cholelithiasis noted Bones:No aggressive lesion    Impression: Impression: Postsurgical changes in the right lung with resolution of previously have is extensive right lung airspace disease. There are new left lung nodules measuring up to 9 mm, as described, for which short-term follow-up recommended CT examination performed with dose lowering protocol in accordance with ALARA. Workstation:[id number]    I reviewed the above laboratory and imaging data.     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 We will plan on an office excision at his convenience. All questions answered.

## 2023-08-21 ENCOUNTER — PROCEDURE VISIT (OUTPATIENT)
Dept: SURGICAL ONCOLOGY | Facility: CLINIC | Age: 78
End: 2023-08-21
Payer: COMMERCIAL

## 2023-08-21 VITALS
OXYGEN SATURATION: 96 % | HEART RATE: 70 BPM | BODY MASS INDEX: 22.36 KG/M2 | RESPIRATION RATE: 18 BRPM | SYSTOLIC BLOOD PRESSURE: 124 MMHG | HEIGHT: 69 IN | TEMPERATURE: 97.3 F | DIASTOLIC BLOOD PRESSURE: 70 MMHG | WEIGHT: 151 LBS

## 2023-08-21 DIAGNOSIS — D23.9 DYSPLASTIC NEVUS: Primary | ICD-10-CM

## 2023-08-21 PROCEDURE — 11603 EXC TR-EXT MAL+MARG 2.1-3 CM: CPT | Performed by: SURGERY

## 2023-09-06 PROBLEM — D03.60: Status: ACTIVE | Noted: 2023-09-06

## 2023-09-06 PROCEDURE — 88341 IMHCHEM/IMCYTCHM EA ADD ANTB: CPT | Performed by: PATHOLOGY

## 2023-09-06 PROCEDURE — 88342 IMHCHEM/IMCYTCHM 1ST ANTB: CPT | Performed by: PATHOLOGY

## 2023-09-06 PROCEDURE — 88305 TISSUE EXAM BY PATHOLOGIST: CPT | Performed by: PATHOLOGY

## 2023-09-07 ENCOUNTER — TELEPHONE (OUTPATIENT)
Dept: HEMATOLOGY ONCOLOGY | Facility: CLINIC | Age: 78
End: 2023-09-07

## 2023-09-07 NOTE — TELEPHONE ENCOUNTER
Call Transfer   Who are you speaking with? Spouse   If it is not the patient, are they listed on an active communication consent form? Yes   Who is the patients HemOnc/SurgOnc provider? Dr. Lorena York   What is the reason for this call? Syed Palencia calling in wanting to go over the patient's biopsy results. Person/Department that the call was transferred to? Time that call was transferred? Kendraelyovi Estess @ 9:16AM    Your call will be transferred now. If you receive a voicemail, please leave a detailed message and a member of the team will return your call as soon as possible. Did you relay this information to the caller?   Yes

## 2023-09-11 ENCOUNTER — OFFICE VISIT (OUTPATIENT)
Dept: SURGICAL ONCOLOGY | Facility: CLINIC | Age: 78
End: 2023-09-11
Payer: COMMERCIAL

## 2023-09-11 VITALS
HEIGHT: 69 IN | RESPIRATION RATE: 21 BRPM | OXYGEN SATURATION: 97 % | WEIGHT: 151.8 LBS | DIASTOLIC BLOOD PRESSURE: 70 MMHG | TEMPERATURE: 97.5 F | SYSTOLIC BLOOD PRESSURE: 118 MMHG | HEART RATE: 77 BPM | BODY MASS INDEX: 22.48 KG/M2

## 2023-09-11 DIAGNOSIS — D03.61 MELANOMA IN SITU OF RIGHT UPPER EXTREMITY INCLUDING SHOULDER (HCC): ICD-10-CM

## 2023-09-11 DIAGNOSIS — D23.9 DYSPLASTIC NEVUS: Primary | ICD-10-CM

## 2023-09-11 PROCEDURE — 99213 OFFICE O/P EST LOW 20 MIN: CPT | Performed by: SURGERY

## 2023-09-11 NOTE — PROGRESS NOTES
Surgical Oncology Follow Up       [address] [facility]  [address]  [facility] [address] [phone]    Meghana Thomas  1945  [id number]  [address] [facility] Chergayatri Palacios  [address] [facility] [address] [phone]    Chief Complaint   Patient presents with   • Follow-up       Assessment/Plan:    No problem-specific Assessment & Plan notes found for this encounter. 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 Oncology History   History of melanoma   2/4/2015 Biopsy    Left lateral forearm shave biopsy    Melanoma, 0.25 mm  Mitotic index: 0  Ulceration: not seen    At least Stage IA- pT1a, pNx     3/17/2015 Surgery    Wide excision (Dr. Caesar Gibson)    Prior biopsy site reaction; melanocytic hyperplasia     Dysplastic nevus   7/20/2023 Biopsy    Consult case (12 slides [id number] [facility], collected 6/22/2023):     Skin, right paraspinal back T3, shave biopsy:     Lentiginous compound dysplastic nevus with severe atypia; approaching peripheral specimen margin    Skin, right mid back T5, shave biopsy:     Lentiginous compound dysplastic nevus with severe atypia; extending to peripheral specimen margin        8/21/2023 Surgery    Skin, back, inferior, excision:  Focus of compound melanocytic proliferation and scar, compatible with residual compound dysplastic nevus; margins free    Skin, superior back, excision:  Scar. No residual melanocytic neoplasm identified in the examined sections.      Melanoma in situ of upper extremity, including shoulder ([address])    Biopsy    Skin, right shoulder, punch biopsy:  Severely atypical lentiginous junctional melanocytic proliferation focally consistent with melanoma in situ (lentigo maligna type), extending to the tissue edges. History of Present Illness: 81-year-old man here for postprocedural check status post excision of 2 lesions on his back, as well as recent right shoulder biopsy.  -Interval History: No issues or complaints since procedure performed. Review of Systems:  Review of Systems   Constitutional: Negative. HENT: Negative. Eyes: Negative. Respiratory: Negative. Cardiovascular: Negative. Gastrointestinal: Negative. Endocrine: Negative. Genitourinary: Negative. Musculoskeletal: Negative. Skin: Positive for wound. Allergic/Immunologic: Negative. Neurological: Negative. Hematological: Negative. Psychiatric/Behavioral: Negative. All other systems reviewed and are negative. Patient Active Problem List   Diagnosis   • Pulmonary nodule   • Diabetes 1.5, managed as type 2 ([address])   • Accelerated hypertension   • History of melanoma   • Community acquired pneumonia   • Encounter for follow-up surveillance of melanoma   • COVID-19   • Orthostatic hypotension   • Ambulatory dysfunction   • Gastritis   • Dysplastic nevus   • Melanoma in situ of upper extremity, including shoulder ([address])     Past Medical History:   Diagnosis Date   • Arthritis    • Diabetes mellitus ([address])      Past Surgical History:   Procedure Laterality Date   • SKIN BIOPSY       No family history on file.   Social History     Socioeconomic History   • Marital status: /Civil Union     Spouse name: Not on file   • Number of children: Not on file   • Years of education: Not on file   • Highest education level: Not on file   Occupational History   • Not on file   Tobacco Use   • Smoking status: Former     Types: Cigarettes     Quit date: 12/3/2015     Years since quittin.7   • Smokeless tobacco: Former   • Tobacco comments:     pt quit   Substance and Sexual Activity   • Alcohol use: No   • Drug use: No   • Sexual activity: Not on file   Other Topics Concern   • Not on file   Social History Narrative   • Not on file     Social Determinants of Health     Financial Resource Strain: Not on file   Food Insecurity: Not on file   Transportation Needs: Not on file   Physical Activity: Not on file   Stress: Not on file   Social Connections: Not on file   Intimate Partner Violence: Not on file   Housing Stability: Not on file       Current Outpatient Medications:   •  ascorbic acid (VITAMIN C) 500 MG tablet, Take 500 mg by mouth daily, Disp: , Rfl:   •  aspirin 81 MG tablet, Take 81 mg by mouth daily, Disp: , Rfl:   •  Cholecalciferol (VITAMIN D) 2000 UNITS CAPS, Take by mouth., Disp: , Rfl:   •  cyanocobalamin (VITAMIN B-12) 1000 MCG tablet, Take 1,000 mcg by mouth daily, Disp: , Rfl:   •  Epoetin Juve (PROCRIT IJ), Inject as directed once a week., Disp: , Rfl:   •  finasteride (PROSCAR) 5 mg tablet, , Disp: , Rfl:   •  fluconazole (DIFLUCAN) 100 mg tablet, , Disp: , Rfl:   •  metFORMIN (GLUCOPHAGE) 1000 MG tablet, Take 1,000 mg by mouth 2 (two) times a day with meals. , Disp: , Rfl:   •  pantoprazole (PROTONIX) 40 mg tablet, TAKE 1 TABLET BY MOUTH EVERY DAY, Disp: 30 tablet, Rfl: 0  •  pravastatin (PRAVACHOL) 10 mg tablet, Take 10 mg by mouth daily, Disp: , Rfl:   •  Tiotropium Bromide Monohydrate (SPIRIVA RESPIMAT IN), Inhale, Disp: , Rfl:   •  Irbesartan (AVAPRO PO), Take by mouth.  (Patient not taking: Reported on 8/21/2023), Disp: , Rfl:   •  Liraglutide (VICTOZA SC), Inject 1.8 mg under the skin daily at bedtime   (Patient not taking: Reported on 7/26/2023), Disp: , Rfl:   •  midodrine (PROAMATINE) 10 MG tablet, Take 1 tablet (10 mg total) by mouth 3 (three) times a day (Patient not taking: Reported on 8/21/2023), Disp: 90 tablet, Rfl: 0  •  valACYclovir (VALTREX) 500 mg tablet, Take 500 mg by mouth daily (Patient not taking: Reported on 8/21/2023), Disp: , Rfl: 3  No Known Allergies  Vitals:    09/11/23 1010   BP: 118/70   Pulse: 77   Resp: 21   Temp: 97.5 °F (36.4 °C)   SpO2: 97%       Physical Exam  Vitals reviewed. Constitutional:       Appearance: Normal appearance. HENT:      Head: Normocephalic and atraumatic. Nose: Nose normal.   Eyes:      Extraocular Movements: Extraocular movements intact. Pupils: Pupils are equal, round, and reactive to light. Cardiovascular:      Rate and Rhythm: Normal rate and regular rhythm. Heart sounds: Normal heart sounds. Pulmonary:      Effort: Pulmonary effort is normal.      Breath sounds: Normal breath sounds. Abdominal:      General: Abdomen is flat. Palpations: Abdomen is soft. Musculoskeletal:         General: Normal range of motion. Cervical back: Normal range of motion and neck supple. Skin:     General: Skin is warm. Neurological:      General: No focal deficit present. Mental Status: He is alert and oriented to person, place, and time. Psychiatric:         Mood and Affect: Mood normal.         Behavior: Behavior normal.         Thought Content:  Thought content normal.         Judgment: Judgment normal.           Results:  Case Report   Surgical Pathology Report                         Case: [id number]                                    Authorizing Provider: Angela Ni MD        Collected:           08/21/2023 1203               Ordering Location:     [facility]     Received:            08/21/2023 1203                                      [facility]                                                                                    [facility]                                                          Pathologist:           Richard Garcia MD                                                              Specimens:   A) - Lesion, inferior                                                                                B) - Lesion, right shoulder                                                                          C) - Lesion, superior back                                                                 Final Diagnosis   A. Skin, back, inferior, excision:  Focus of compound melanocytic proliferation and scar, compatible with residual compound dysplastic nevus; margins free. See comment.     Comment: The adjacent skin shows solar elastosis and actinic granuloma changes. Clinicopathologic correlation is recommended. Immunohistochemical stains for SOX10, Melan-A, and CD68 performed with adequate controls were reviewed.            B. Skin, right shoulder, punch biopsy:  Severely atypical lentiginous junctional melanocytic proliferation focally consistent with melanoma in situ (lentigo maligna type), extending to the tissue edges. See comment.     Comment: Immunohistochemical stains for SOX10 and Melan-A performed with adequate controls support the findings. Immunohistochemical stain for PRAME is negative (expressed in a minor subset of the lesional cells). P16 immunohistochemical stain shows diminished expression. Dr. Kylie Vizcarra has been notified of this diagnosis on 09/06/23  The case has been sent for expert consultation to Sundar Guerra MD. See full consult report in the "Note" section below.             C. Skin, superior back, excision:  Scar. See comment.     Comment: No residual melanocytic neoplasm identified in the examined sections. Rare dermal melanocytes consistent with tangential sections of melanocytic hyperplasia extension along adnexa are seen. An immunohistochemical stain for Melan-A performed with adequate control supports the findings.      Electronically signed by Lorena Zapata MD on 9/6/2023 at 10:19 AM   Note          Imaging  No results found. I reviewed the above laboratory and imaging data.     Discussion/Summary: Status post excision of melanoma from back, margins clear.  Right shoulder biopsy showing melanoma in situ. He is scheduled for an office procedure to address this lesion as well. All questions answered, sutures removed, and copy of pathology reports given to him for his records.

## 2023-09-27 ENCOUNTER — PROCEDURE VISIT (OUTPATIENT)
Dept: SURGICAL ONCOLOGY | Facility: CLINIC | Age: 78
End: 2023-09-27

## 2023-09-27 VITALS
DIASTOLIC BLOOD PRESSURE: 58 MMHG | OXYGEN SATURATION: 98 % | WEIGHT: 158.2 LBS | HEIGHT: 69 IN | SYSTOLIC BLOOD PRESSURE: 138 MMHG | TEMPERATURE: 97.9 F | HEART RATE: 68 BPM | BODY MASS INDEX: 23.43 KG/M2

## 2023-09-27 DIAGNOSIS — D03.61 MELANOMA IN SITU OF RIGHT UPPER EXTREMITY INCLUDING SHOULDER (HCC): Primary | ICD-10-CM

## 2023-09-27 PROCEDURE — 88305 TISSUE EXAM BY PATHOLOGIST: CPT | Performed by: PATHOLOGY

## 2023-09-27 RX ORDER — PANTOPRAZOLE SODIUM 20 MG/1
TABLET, DELAYED RELEASE ORAL
COMMUNITY
Start: 2023-09-07

## 2023-09-27 NOTE — PROGRESS NOTES
Skin excision    Date/Time: 9/27/2023 2:45 PM    Performed by: Holly Cortes MD  Authorized by: Holly Cortes MD  Universal Protocol:  Consent: Verbal consent obtained. Written consent obtained. Patient understanding: patient states understanding of the procedure being performed      Procedure Details - Skin Excision:     Number of Lesions:  1  Lesion 1:     Body area:  Head/neck    Malignancy: malignant lesion      Skin cancer type: melanoma  Clinical margin width: 0.5 mm     Depth of excision: full-thickness skin/subcutaneous tissue down to fascia (melanoma)          Cosmetic?: Yes      Closure complexity: intermediate      Surgical defect:  3.0 x 2.0 x 0.5 cm     Repair Comments: Melanoma in situ right neck/trapezius area. This was identified, then prepped and draped. This was anesthetized with 20 cc of local anesthesia. Following this 5 mm margins were drawn around the visible lesion. These were then incised sharply. Cautery was used to plan full-thickness excision down to the subtenons fat and fascia. Specimen was then removed. Resulting defect measured 3.0 x 2.0 x 0.5 cm. Closure was then performed using 3-0 Vicryl for the deep dermal layers followed by running 3-0 nylon suture. Antibiotic ointment applied followed by gauze and Tegaderm. Patient tolerated procedure well.

## 2023-09-29 ENCOUNTER — TELEPHONE (OUTPATIENT)
Dept: HEMATOLOGY ONCOLOGY | Facility: CLINIC | Age: 78
End: 2023-09-29

## 2023-09-29 NOTE — TELEPHONE ENCOUNTER
Appointment Confirmation   Who are you speaking with? Patient and Spouse   If it is not the patient, are they listed on an active communication consent form? Yes   Which provider is the appointment scheduled with? Dr. Dawna Cabrera   When is the appointment scheduled? Please list date and time 10/11/23 @ 330   At which location is the appointment scheduled to take place? [place]   Did caller verbalize understanding of appointment details?  Yes

## 2023-09-29 NOTE — TELEPHONE ENCOUNTER
Spoke to patient's wife and she will reach out to finance. Dr. Adria Marsh' notes do state that patient is here for post procedural visit.

## 2023-09-29 NOTE — TELEPHONE ENCOUNTER
Patient Call    Who are you speaking with? Spouse    If it is not the patient, are they listed on an active communication consent form? Yes   What is the reason for this call? Patients wife stated they got a bill for $30 for "post op" and the billing department states they can not take the payment because he did not state that in the note about a post op or the stitches coming out    Does this require a call back? Yes   If a call back is required, please list best call back number [phone]   If a call back is required, advise that a message will be forwarded to their care team and someone will return their call as soon as possible. Did you relay this information to the patient?  Yes

## 2023-10-02 ENCOUNTER — OFFICE VISIT (OUTPATIENT)
Dept: NEUROLOGY | Facility: CLINIC | Age: 78
End: 2023-10-02
Payer: COMMERCIAL

## 2023-10-02 VITALS
BODY MASS INDEX: 22.93 KG/M2 | WEIGHT: 154.8 LBS | DIASTOLIC BLOOD PRESSURE: 70 MMHG | HEIGHT: 69 IN | HEART RATE: 68 BPM | SYSTOLIC BLOOD PRESSURE: 122 MMHG

## 2023-10-02 DIAGNOSIS — G31.84 MCI (MILD COGNITIVE IMPAIRMENT): Primary | ICD-10-CM

## 2023-10-02 PROCEDURE — 99204 OFFICE O/P NEW MOD 45 MIN: CPT | Performed by: STUDENT IN AN ORGANIZED HEALTH CARE EDUCATION/TRAINING PROGRAM

## 2023-10-02 NOTE — PROGRESS NOTES
[facility] Neurology Consult  PATIENT:  Viktor Ford  MRN:  [id number]  :  1945  DATE OF SERVICE:  10/2/2023  REFERRED BY: Self, Referral  PMD: Aaliyah Crespo DO    Assessment/Plan:     Viktor Ford is a very pleasant 78 y.o. male with a past medical history that includes diabetes, HTN, melanoma, myelodysplastic syndrome who presents for evaluation of memory loss.     MCI  -MOCA  today, improved since most recent MOCA  per [facility]. He continues to be independent of ADLs at home. Symptoms likely multifactorial, possibly related to impact of comorbid medical conditions, variable sleep, fatigue and mood factors. Cannot r/o underlying neurodegenerative process  -PET CT pending for further w/u   -discussed starting lexapro as recommended previously by [facility] neurology given concerns for contribution of mood symptoms    CC:   Memory loss    History of Present Illness:     78 y.o. male with a past medical history that includes diabetes, HTN, melanoma, myelodysplastic syndrome who presents for evaluation of memory loss.     Since covid endorses history of word-finding difficulties and short term memory loss. Had covid in  and states that symptoms have been ongoing since then.     Speech therapy - short period of time  Denies depression/anxiety. Had script for lexapro but does not want to take it  History of dementia in mother    Previously seen and worked up extensively by [facility] neurology for the same symptoms. Last seen by [facility] neurology on 23. At that visit, reported continued difficulties with short term memory, word-finding difficulties, and lack of motivation. Neuropsych testing below.     The patient is 77 years old left-handed male with previous medical history of myelodysplastic syndrome, diabetes, previous history of lung cancer and COVID infection presented for evaluation with complaints of cognitive difficulties. He was accompanied by his wife. In February of last year he was admitted for  "COVID-pneumonia. During this admission he was extremely encephalopathic and agitated. Has no recollection of this admission. After he was discharged was having difficulties with names. Sometimes looking for words but cannot find the words few minutes later. Occasionally \"jumbled words \". No difficulties with long-term memory. Sometimes difficulties to remember recent events. Overall he thinks that his symptoms are improving. His is driving locally. No significant problems with directions. Was able to take care of his taxes. Denies clear depression but some frustration because of his limitations. He also had UTI in summer which was associated with transient confusion. Was found to have severe anemia finally was diagnosed with myelodysplastic syndrome. Was extremely weak. His symptoms has improved after blood transfusion and iron infusion. The patient has masters degree worked as a CPA. No history of learning disability. Mother had diagnosis of dementia.    Neuropsychology  Overall performance on this neuropsychological evaluation revealed average verbal skills with lower perceptual reasoning skills. His performance was intact across measures of word reading, immediate attention, object naming, basic language functions, and visuospatial perception and construction. His performance was relatively weak across measures of verbal and visual memory. He demonstrated difficulty in the initial encoding of information. He was not consistently aided by repetition or cueing. His performance generally improved when provided with a recognition format - suggesting difficulty with retrieval. There was indication of difficulty on formal measures of executive functioning and informally throughout the evaluation (e.g., disorganized task approach, false positives, intrusion errors, set loss). Processing speed was relatively slow. On self-report measures of affective functioning, he endorsed a minimal level of low mood and mild level of " anxiety. During the clinical interview he reported feeling frustrated and more irritable than he was in the past. He noted situational anxiety.     Taken together, Mr. Ford's pattern of performance provides evidence of many areas of intact functioning, with relative weakness in memory (encoding) and executive functions. His processing speed was reduced at times. He denied significant difficulty with functional tasks. At minimum he meets criteria for mild cognitive impairment (mild neurocognitive disorder), given his pattern of performance and reportedly preserved independence with daily activities. However, his cognition should continue to be monitored. The etiology of his cognitive difficulties is not entirely clear at this point in time as there are several factors likely to be impacting the larger clinical picture. While not an exhaustive list, consideration should be given to a vascular or mixed etiology, in addition to the potential impact of comorbid medical conditions (myelodysplastic syndrome, cerebrovascular risk factors), variable sleep, fatigue and mood factors.     Last seen by [facility] neurology on 9/26/23. At that visit, reported continued difficulties with short term memory, word-finding difficulties, and lack of motivation. MOCA 21/30 at that visit. PET CT was recommended and script for lexapro provided.     Past Medical History:     Past Medical History:   Diagnosis Date    Arthritis     Diabetes mellitus (HCC)        Patient Active Problem List   Diagnosis    Pulmonary nodule    Diabetes 1.5, managed as type 2 ([facility])    Accelerated hypertension    History of melanoma    Community acquired pneumonia    Encounter for follow-up surveillance of melanoma    COVID-19    Orthostatic hypotension    Ambulatory dysfunction    Gastritis    Dysplastic nevus    Melanoma in situ of upper extremity, including shoulder (HCC)       Medications:      Current Outpatient Medications   Medication Sig Dispense Refill     ascorbic acid (VITAMIN C) 500 MG tablet Take 500 mg by mouth daily      aspirin 81 MG tablet Take 81 mg by mouth daily      Cholecalciferol (VITAMIN D) 2000 UNITS CAPS Take by mouth.      cyanocobalamin (VITAMIN B-12) 1000 MCG tablet Take 1,000 mcg by mouth daily      Epoetin Juve-epbx (RETACRIT IJ) Inject as directed      finasteride (PROSCAR) 5 mg tablet       fluconazole (DIFLUCAN) 100 mg tablet       metFORMIN (GLUCOPHAGE) 1000 MG tablet Take 1,000 mg by mouth 2 (two) times a day with meals.      pantoprazole (PROTONIX) 20 mg tablet       pravastatin (PRAVACHOL) 10 mg tablet Take 10 mg by mouth daily      Tiotropium Bromide Monohydrate (SPIRIVA RESPIMAT IN) Inhale      Epoetin Juve (PROCRIT IJ) Inject as directed once a week.      Irbesartan (AVAPRO PO) Take by mouth. (Patient not taking: Reported on 2023)      Liraglutide (VICTOZA SC) Inject 1.8 mg under the skin daily at bedtime   (Patient not taking: Reported on 2023)      midodrine (PROAMATINE) 10 MG tablet Take 1 tablet (10 mg total) by mouth 3 (three) times a day (Patient not taking: Reported on 2023) 90 tablet 0    pantoprazole (PROTONIX) 40 mg tablet TAKE 1 TABLET BY MOUTH EVERY DAY (Patient not taking: Reported on 2023) 30 tablet 0    valACYclovir (VALTREX) 500 mg tablet Take 500 mg by mouth daily (Patient not taking: Reported on 2023)  3     No current facility-administered medications for this visit.        Allergies:    No Known Allergies    Family History:     No family history on file.    Social History:       Social History     Socioeconomic History    Marital status: /Civil Union     Spouse name: Not on file    Number of children: Not on file    Years of education: Not on file    Highest education level: Not on file   Occupational History    Not on file   Tobacco Use    Smoking status: Former     Types: Cigarettes     Quit date: 12/3/2015     Years since quittin.8    Smokeless tobacco: Former    Tobacco  "comments:     pt quit   Substance and Sexual Activity    Alcohol use: No    Drug use: No    Sexual activity: Not on file   Other Topics Concern    Not on file   Social History Narrative    Not on file     Social Determinants of Health     Financial Resource Strain: Not on file   Food Insecurity: Not on file   Transportation Needs: Not on file   Physical Activity: Not on file   Stress: Not on file   Social Connections: Not on file   Intimate Partner Violence: Not on file   Housing Stability: Not on file         Objective:   /70 (BP Location: Right arm, Patient Position: Sitting, Cuff Size: Standard)   Pulse 68   Ht 5' 9\" (1.753 m)   Wt 70.2 kg (154 lb 12.8 oz)   BMI 22.86 kg/m²     General: Patient is not in any acute/apparent distress, well nourished, well developed and cooperative.   HEENT: normocephalic, atraumatic, moist membranes  Neck: supple  Extremities: no edema noted   Skin: no lesions or rash  Musculosketal: no bony abnormalities    Neurologic Examination:   Mental status: see moca    Speech/Language: Speech is fluent without any dysarthria, no aphasia noted, comprehension intact    Cranial Nerves:   CN I: smell not tested  CN II: Visual fields full to confrontation  CN III, IV, VI: Extraocular movements intact bilaterally. Pupils equal round and reactive to light bilaterally.  CN V: Facial sensation is normal.  CN VII: Full and symmetric facial movement.  CN VIII: Hearing is normal.  CN IX, X: Palate elevates symmetrically.   CN XI: Shoulder shrug strength is normal.  CN XII: Tongue midline without atrophy or fasciculations.    Motor:   Strength 5/5 in all 4 extremities. Bulk/tone - normal.  Fasiculations - none    Sensory:   Sensation intact to soft touch in all 4 extremities.    Cerebellar:   Finger-to-nose intact, normal heel to shin.    Reflexes: 2+ in all 4 extremities  Pathologic reflexes - babinski reflex negative    Gait:   Normal gait       Review of Systems:     ROS:    Review of " Systems   Constitutional: Negative for appetite change, fatigue and fever.   HENT: Negative.  Negative for hearing loss, tinnitus, trouble swallowing and voice change.    Eyes: Negative.  Negative for photophobia, pain and visual disturbance.   Respiratory: Negative.  Negative for shortness of breath.    Cardiovascular: Negative.  Negative for palpitations.   Gastrointestinal: Negative.  Negative for nausea and vomiting.   Endocrine: Negative.  Negative for cold intolerance.   Genitourinary: Negative.  Negative for dysuria, frequency and urgency.   Musculoskeletal: Negative for back pain, gait problem, myalgias and neck pain.   Skin: Negative.  Negative for rash.   Allergic/Immunologic: Negative.    Neurological: Positive for dizziness (When he looks up) and speech difficulty. Negative for tremors, seizures, syncope, facial asymmetry, weakness, light-headedness, numbness and headaches.   Hematological: Bruises/bleeds easily.   Psychiatric/Behavioral: Negative.  Negative for confusion, hallucinations and sleep disturbance.     I have spent a total time of 56 min on 10/2/23  in caring for this patient including Risks and benefits of tx options, Instructions for management, Patient and family education, Risk factor reductions, Impressions, Documenting in the medical record, Reviewing / ordering tests, medicine, procedures  , and Obtaining or reviewing history  .

## 2023-10-02 NOTE — PATIENT INSTRUCTIONS
Patient Instructions:  -please schedule your PET CT scan if you would like to have it performed  -continue aspirin and pravastatin daily  -follow up in about 6 months

## 2023-10-05 PROCEDURE — 88305 TISSUE EXAM BY PATHOLOGIST: CPT | Performed by: PATHOLOGY

## 2023-10-11 ENCOUNTER — OFFICE VISIT (OUTPATIENT)
Dept: SURGICAL ONCOLOGY | Facility: CLINIC | Age: 78
End: 2023-10-11
Payer: COMMERCIAL

## 2023-10-11 ENCOUNTER — TELEPHONE (OUTPATIENT)
Dept: HEMATOLOGY ONCOLOGY | Facility: CLINIC | Age: 78
End: 2023-10-11

## 2023-10-11 VITALS
HEART RATE: 86 BPM | TEMPERATURE: 97.5 F | WEIGHT: 154 LBS | DIASTOLIC BLOOD PRESSURE: 62 MMHG | RESPIRATION RATE: 18 BRPM | SYSTOLIC BLOOD PRESSURE: 150 MMHG | OXYGEN SATURATION: 96 % | BODY MASS INDEX: 22.81 KG/M2 | HEIGHT: 69 IN

## 2023-10-11 DIAGNOSIS — D03.61 MELANOMA IN SITU OF RIGHT UPPER EXTREMITY INCLUDING SHOULDER (HCC): Primary | ICD-10-CM

## 2023-10-11 PROCEDURE — 99212 OFFICE O/P EST SF 10 MIN: CPT | Performed by: SURGERY

## 2023-10-11 RX ORDER — AMOXICILLIN 500 MG/1
CAPSULE ORAL
COMMUNITY
Start: 2023-09-28

## 2023-10-11 NOTE — TELEPHONE ENCOUNTER
Patient wife called in stating Saran Medina called and left a message about changing the time for her  before 3pm. I didn't see any notes nor any time open to reschedule him.

## 2023-10-11 NOTE — PROGRESS NOTES
Surgical Oncology Follow Up       [address] [facility] [place]  [address]  [facility] [address] [phone]    Dietra Hind  1945  [id number]  [address] [facility]  [address] [place] [id number] [phone]    Chief Complaint   Patient presents with    Post-op     Patient being seen for post op for excision melanoma in situ right shoulder. Assessment/Plan:    No problem-specific Assessment & Plan notes found for this encounter. 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 Oncology History   History of melanoma   2/4/2015 Biopsy    Left lateral forearm shave biopsy    Melanoma, 0.25 mm  Mitotic index: 0  Ulceration: not seen    At least Stage IA- pT1a, pNx     3/17/2015 Surgery    Wide excision (Dr. Jaziel Wilson)    Prior biopsy site reaction; melanocytic hyperplasia     Dysplastic nevus   7/20/2023 Biopsy    Consult case (12 slides [id number] [facility], collected 6/22/2023):     Skin, right paraspinal back T3, shave biopsy:     Lentiginous compound dysplastic nevus with severe atypia; approaching peripheral specimen margin    Skin, right mid back T5, shave biopsy:     Lentiginous compound dysplastic nevus with severe atypia; extending to peripheral specimen margin        8/21/2023 Surgery    Skin, back, inferior, excision:  Focus of compound melanocytic proliferation and scar, compatible with residual compound dysplastic nevus; margins free    Skin, superior back, excision:  Scar. No residual melanocytic neoplasm identified in the examined sections. Melanoma in situ of upper extremity, including shoulder ([address])    Biopsy    Skin, right shoulder, punch biopsy:  Severely atypical lentiginous junctional melanocytic proliferation focally consistent with melanoma in situ (lentigo maligna type), extending to the tissue edges. 2023 Surgery    Skin, right neck, excision:  - Residual melanoma in-situ (lentigo maligna); prior biopsy site reaction.  - Lentigo (incidental). - Inked margins with no tumor seen. Patient is a 41-year-old man here for follow-up visit status post excision of right neck melanoma in situ. History of Present Illness: He is 2 weeks post excision of a right neck melanoma in situ.  -Interval History: No issues or problems with the site since procedure was done. Review of Systems:  Review of Systems   Skin:  Positive for wound. All other systems reviewed and are negative. Patient Active Problem List   Diagnosis    Pulmonary nodule    Diabetes 1.5, managed as type 2 ([address])    Accelerated hypertension    History of melanoma    Community acquired pneumonia    Encounter for follow-up surveillance of melanoma    COVID-19    Orthostatic hypotension    Ambulatory dysfunction    Gastritis    Dysplastic nevus    Melanoma in situ of upper extremity, including shoulder ([address])     Past Medical History:   Diagnosis Date    Arthritis     Diabetes mellitus ([address])      Past Surgical History:   Procedure Laterality Date    SKIN BIOPSY       No family history on file.   Social History     Socioeconomic History    Marital status: /Civil Union     Spouse name: Not on file    Number of children: Not on file    Years of education: Not on file    Highest education level: Not on file   Occupational History    Not on file   Tobacco Use    Smoking status: Former     Types: Cigarettes     Quit date: 12/3/2015     Years since quittin.8    Smokeless tobacco: Former    Tobacco comments:     pt quit Substance and Sexual Activity    Alcohol use: No    Drug use: No    Sexual activity: Not on file   Other Topics Concern    Not on file   Social History Narrative    Not on file     Social Determinants of Health     Financial Resource Strain: Not on file   Food Insecurity: Not on file   Transportation Needs: Not on file   Physical Activity: Not on file   Stress: Not on file   Social Connections: Not on file   Intimate Partner Violence: Not on file   Housing Stability: Not on file       Current Outpatient Medications:     amoxicillin (AMOXIL) 500 mg capsule, TAKE 1 CAPSULE BY MOUTH THREE TIMES A DAY AS DIRECTED, Disp: , Rfl:     ascorbic acid (VITAMIN C) 500 MG tablet, Take 500 mg by mouth daily, Disp: , Rfl:     aspirin 81 MG tablet, Take 81 mg by mouth daily, Disp: , Rfl:     Cholecalciferol (VITAMIN D) 2000 UNITS CAPS, Take by mouth., Disp: , Rfl:     cyanocobalamin (VITAMIN B-12) 1000 MCG tablet, Take 1,000 mcg by mouth daily, Disp: , Rfl:     Epoetin Juve-epbx (RETACRIT IJ), Inject as directed, Disp: , Rfl:     finasteride (PROSCAR) 5 mg tablet, , Disp: , Rfl:     fluconazole (DIFLUCAN) 100 mg tablet, , Disp: , Rfl:     metFORMIN (GLUCOPHAGE) 1000 MG tablet, Take 1,000 mg by mouth 2 (two) times a day with meals. , Disp: , Rfl:     pantoprazole (PROTONIX) 20 mg tablet, , Disp: , Rfl:     pravastatin (PRAVACHOL) 10 mg tablet, Take 10 mg by mouth daily, Disp: , Rfl:     Tiotropium Bromide Monohydrate (SPIRIVA RESPIMAT IN), Inhale, Disp: , Rfl:     Epoetin Juve (PROCRIT IJ), Inject as directed once a week., Disp: , Rfl:     Irbesartan (AVAPRO PO), Take by mouth.  (Patient not taking: Reported on 8/21/2023), Disp: , Rfl:     Liraglutide (VICTOZA SC), Inject 1.8 mg under the skin daily at bedtime   (Patient not taking: Reported on 7/26/2023), Disp: , Rfl:     midodrine (PROAMATINE) 10 MG tablet, Take 1 tablet (10 mg total) by mouth 3 (three) times a day (Patient not taking: Reported on 8/21/2023), Disp: 90 tablet, Rfl: 0    pantoprazole (PROTONIX) 40 mg tablet, TAKE 1 TABLET BY MOUTH EVERY DAY (Patient not taking: Reported on 9/27/2023), Disp: 30 tablet, Rfl: 0    valACYclovir (VALTREX) 500 mg tablet, Take 500 mg by mouth daily (Patient not taking: Reported on 8/21/2023), Disp: , Rfl: 3  No Known Allergies  Vitals:    10/11/23 1512   BP: 150/62   Pulse: 86   Resp: 18   Temp: 97.5 °F (36.4 °C)   SpO2: 96%       Physical Exam  Vitals reviewed. Skin:     Comments: Right neck incision clean dry intact           Results:  Labs:  CBC, Coags, BMP, Mg, Phos @[facility]@  Case ReportSurgical Pathology Report                         Case: [id number]                                Authorizing Provider:  Doron Badillo MD        Collected:           08/21/2023 1203            Ordering Location:     [address] [place] [address]     Received:            08/21/2023 [address] [facility]                                                      Pathologist:           Naun Wiggins MD                                                          Specimens:   A) - Lesion, inferior                                                                             B) - Lesion, right shoulder                                                                       C) - Lesion, superior back                                                          Final Diagnosis  A. Skin, back, inferior, excision:  Focus of compound melanocytic proliferation and scar, compatible with residual compound dysplastic nevus; margins free. See comment. Comment: The adjacent skin shows solar elastosis and actinic granuloma changes. Clinicopathologic correlation is recommended. Immunohistochemical stains for SOX10, Melan-A, and CD68 performed with adequate controls were reviewed.             B. Skin, right shoulder, punch biopsy:  Severely atypical lentiginous junctional melanocytic proliferation focally consistent with melanoma in situ (lentigo maligna type), extending to the tissue edges. See comment. Comment: Immunohistochemical stains for SOX10 and Melan-A performed with adequate controls support the findings. Immunohistochemical stain for PRAME is negative (expressed in a minor subset of the lesional cells). P16 immunohistochemical stain shows diminished expression. Dr. Kylie Vizcarra has been notified of this diagnosis on 09/06/23  The case has been sent for expert consultation to Sundar Guerra MD. See full consult report in the "Note" section below. C. Skin, superior back, excision:  Scar. See comment. Comment: No residual melanocytic neoplasm identified in the examined sections. Rare dermal melanocytes consistent with tangential sections of melanocytic hyperplasia extension along adnexa are seen. An immunohistochemical stain for Melan-A performed with adequate control supports the findings. Electronically signed by Lorena Zapata MD on 9/6/2023 at 10:19 AM  Note  Dermatopathology Consultation 54-      FINAL DIAGNOSIS: Report date: 9/6/2023      B. SKIN, (RIGHT SHOULDER LESION) BIOPSY:   Severely atypical lentiginous junctional melanocytic proliferation focally consistent with lentigo maligna, extending to the tissue edges. (see note)      Note: Immunohistochemical stains show expression of Sox10, Melan A and reduced staining with p16. PRAME shows weak focal expression. Sundar Guerra M.D. Ph.D. on 9/6/2023  Additional Information  All reported additional testing was performed with appropriately reactive controls.   These tests were developed and their performance characteristics determined by [facility] or appropriate performing facility, though some tests may be performed on tissues which have not been validated for performance characteristics (such as staining performed on alcohol exposed cell blocks and decalcified tissues). Results should be interpreted with caution and in the context of the patients’ clinical condition. These tests may not be cleared or approved by the U.S. Food and Drug Administration, though the FDA has determined that such clearance or approval is not necessary. These tests are used for clinical purposes and they should not be regarded as investigational or for research. This laboratory has been approved by [facility], designated as a high-complexity laboratory and is qualified to perform these tests. Domo Gage Description  A. The specimen is received in formalin, labeled with the patient's name and hospital number, and is designated " inferior". The specimen consists of an unoriented elliptical excision of tan-pink skin with underlying soft tissue measuring 2.4 x 1.3 cm and is excised to a depth of 0.8 cm. There is a 0.7 x 0.5 cm tan-pink puckered previous scar that is 0.3 cm from the peripheral margin. The apparent margin of resection is inked green and the skin surface tips are inked red. The specimen is serially sectioned along the short axis revealing tan-white fibrous cut surfaces. The specimen is entirely submitted with the tips in cassette A1 and the remaining tissue in cassettes A2-A4. A3 contains lesion. B. The specimen is received in formalin, labeled with the patient's name and hospital number, and is designated " right shoulder". The specimen consists of a punch biopsy of tan-brown skin measuring 5 mm in diameter by 7 mm in depth. There is a 3 x 2 mm brown macule that is less than 1 mm away from the peripheral margin. The apparent margin of resection is inked green and the epidermal surface is inked red. The specimen is bisected perpendicular to the skin surface and entirely submitted between sponges in 1 cassette. C. The specimen is received in formalin, labeled with the patient's name and hospital number, and is designated "superior back".   The specimen consists of an unoriented elliptical excision of tan-pink skin with underlying soft tissue measuring 2.4 x 1.3 cm and is excised to a depth of 1.0 cm. There is a 0.7 x 0.6 cm tan-pink puckered previous scar that is 0.3 cm away from the peripheral margin. The apparent margin of resection is inked green and the skin surface tips are inked red. The specimen is serially sectioned along the short axis revealing tan-white fibrous cut surfaces. The specimen is entirely submitted with the tips in cassette C1 and the remaining tissue in cassettes C2-C4. C3 contains lesion. Note: The estimated total formalin fixation time based upon information provided by the submitting clinician and the standard processing schedule is under 72 hours. RRavotti  Clinical InformationLentiginous compound dysplastic nevus with severe atypia in inferior and superior back. RT shoulder lesionResulting AgencyBE 68 LAB   Imaging  CT chest wo contrast    Result Date: 10/5/2023  Narrative: Clinical Indication: Lung nodule. Exam: CT of the chest without IV contrast. Technique: Using helical technique, axial images were obtained through the chest without intravenous contrast. Multiplanar reformations were performed. Comparison: 6/30/2023, 2/9/2021, and 2/3/2020. Findings: Lungs: Stable findings of prior right upper lobectomy. A spiculated nodule in the periphery of the left upper lobe has increased in size, now measuring 10 x 8 mm (image 52/series 301), previously 9 x 7 mm on the most recent prior exam. This lesion was not present on older exams. This is concerning for neoplasm. The additional nodule abutting the pleura at the left apex is unchanged measuring 6 mm, previously 6 mm (22/series 302). No other new nodule is demonstrated. Pleura: Normal. Heart: Heart size is normal. Diffuse coronary artery calcifications are noted.  Vessels: Normal. Mediastinum: Normal. Lymph Nodes: No pathologically enlarged lymph nodes in the chest. Chest wall: Normal. Upper Abdomen/Other: Tiny gallstones are noted within the gallbladder. No gallbladder wall thickening or pericholecystic inflammatory change. Otherwise visualized portions of the upper abdomen are unremarkable. Bones: Degenerative changes are present in the spine. No acute osseous abnormality. Impression: Impression: Stable findings of prior right upper lobectomy. Previously described left upper lobe nodule appears slightly increased in size. This now measures 10 x 7 mm. Findings are concerning for possible neoplasm, either primary malignancy or a metastatic lesion. PET CT could be considered to further characterize. No other sites suspicious for metastatic disease within the chest. CT examination performed with dose lowering protocol in accordance with ERROL. [id number]    I reviewed the above laboratory and imaging data. Discussion/Summary: Melanoma in situ, right neck post excision. Margins clear. Follow-up in 6 months for surveillance given other melanomas excised from back. All questions answered. Copy of report given him for his records.

## 2023-10-11 NOTE — TELEPHONE ENCOUNTER
Appointment Confirmation   Who are you speaking with? Spouse   If it is not the patient, are they listed on an active communication consent form? Yes   Which provider is the appointment scheduled with? Dr. Kim Renee   When is the appointment scheduled? Please list date and time 10/11/23 @ 3   At which location is the appointment scheduled to take place? [place]   Did caller verbalize understanding of appointment details?  Yes

## 2024-02-21 PROBLEM — J18.9 COMMUNITY ACQUIRED PNEUMONIA: Status: RESOLVED | Noted: 2017-03-04 | Resolved: 2024-02-21

## 2024-03-01 ENCOUNTER — TELEPHONE (OUTPATIENT)
Dept: HEMATOLOGY ONCOLOGY | Facility: CLINIC | Age: 79
End: 2024-03-01

## 2024-03-01 NOTE — TELEPHONE ENCOUNTER
Patient Call    Who are you speaking with? [facility]     If it is not the patient, are they listed on an active communication consent form? N/A   What is the reason for this call? [facility] calling in regards to patient's pathology report.  Patient saw Dr Villegas on 9/27/23 to have melanoma removed from his back.  [facility] would like to have the pathology reports for patient faxed to: [phone]    Hoa does not need a call back unless the office needs to speak with her.    Does this require a call back? No   If a call back is required, please list best call back number [phone]   If a call back is required, advise that a message will be forwarded to their care team and someone will return their call as soon as possible.   Did you relay this information to the patient? No

## 2024-03-06 ENCOUNTER — TELEPHONE (OUTPATIENT)
Dept: HEMATOLOGY ONCOLOGY | Facility: CLINIC | Age: 79
End: 2024-03-06

## 2024-03-06 NOTE — TELEPHONE ENCOUNTER
Patient Call    Who are you speaking with? Patient    If it is not the patient, are they listed on an active communication consent form? Yes   What is the reason for this call? Patient got results back and wants to see if he should come in sooner   Does this require a call back? Yes   If a call back is required, please list best call back number [phone]   If a call back is required, advise that a message will be forwarded to their care team and someone will return their call as soon as possible.   Did you relay this information to the patient? Yes

## 2024-03-07 NOTE — TELEPHONE ENCOUNTER
Spoke to patient's wife and she states that patient has BCC on left wrist and would like Dr. Villegas to remove it. I agreed to reach out to [facility] to get the path report and will call back to get him rescheduled.

## 2024-03-08 PROBLEM — C44.619 BASAL CELL CARCINOMA (BCC) OF SKIN OF LEFT UPPER EXTREMITY INCLUDING SHOULDER: Status: ACTIVE | Noted: 2024-03-08

## 2024-03-13 ENCOUNTER — LAB REQUISITION (OUTPATIENT)
Dept: LAB | Facility: HOSPITAL | Age: 79
End: 2024-03-13
Payer: COMMERCIAL

## 2024-03-13 ENCOUNTER — OFFICE VISIT (OUTPATIENT)
Dept: SURGICAL ONCOLOGY | Facility: CLINIC | Age: 79
End: 2024-03-13
Payer: COMMERCIAL

## 2024-03-13 VITALS
BODY MASS INDEX: 22.69 KG/M2 | HEART RATE: 78 BPM | OXYGEN SATURATION: 97 % | SYSTOLIC BLOOD PRESSURE: 124 MMHG | HEIGHT: 69 IN | WEIGHT: 153.2 LBS | DIASTOLIC BLOOD PRESSURE: 60 MMHG | TEMPERATURE: 97.4 F

## 2024-03-13 DIAGNOSIS — C44.619 BASAL CELL CARCINOMA OF SKIN OF LEFT UPPER LIMB, INCLUDING SHOULDER: ICD-10-CM

## 2024-03-13 DIAGNOSIS — C44.619 BASAL CELL CARCINOMA (BCC) OF SKIN OF LEFT UPPER EXTREMITY INCLUDING SHOULDER: Primary | ICD-10-CM

## 2024-03-13 PROCEDURE — 88321 CONSLTJ&REPRT SLD PREP ELSWR: CPT | Performed by: STUDENT IN AN ORGANIZED HEALTH CARE EDUCATION/TRAINING PROGRAM

## 2024-03-13 PROCEDURE — 99213 OFFICE O/P EST LOW 20 MIN: CPT | Performed by: SURGERY

## 2024-03-13 NOTE — PROGRESS NOTES
Surgical Oncology Follow Up       [facility] [place]  [address] [facility] [address]  [phone]    Viktor TITA Ford  1945  [id number]  [facility] [place]  [address] [facility] [address]  [phone]    Chief Complaint   Patient presents with    Follow-up       Assessment/Plan:    No problem-specific Assessment & Plan notes found for this encounter.       Diagnoses and all orders for this visit:    Basal cell carcinoma (BCC) of skin of left upper extremity including shoulder      Advance Care Planning/Advance Directives:  Discussed disease status, cancer treatment plans and/or cancer treatment goals with the patient.     Oncology History   History of melanoma   2/4/2015 Biopsy    Left lateral forearm shave biopsy    Melanoma, 0.25 mm  Mitotic index: 0  Ulceration: not seen    At least Stage IA- pT1a, pNx     3/17/2015 Surgery    Wide excision (Dr. Villegas)    Prior biopsy site reaction; melanocytic hyperplasia     Dysplastic nevus   7/20/2023 Biopsy    Consult case (12 slides [id number] [facility], collected 6/22/2023):     Skin, right paraspinal back T3, shave biopsy:     Lentiginous compound dysplastic nevus with severe atypia; approaching peripheral specimen margin    Skin, right mid back T5, shave biopsy:     Lentiginous compound dysplastic nevus with severe atypia; extending to peripheral specimen margin        8/21/2023 Surgery    Skin, back, inferior, excision:  Focus of compound melanocytic proliferation and scar, compatible with residual compound dysplastic nevus; margins free    Skin, superior back, excision:  Scar.No residual melanocytic neoplasm identified in the examined sections.     Melanoma in situ of upper extremity, including shoulder (HCC)    Biopsy    Skin, right shoulder, punch biopsy:  Severely atypical lentiginous  junctional melanocytic proliferation focally consistent with melanoma in situ (lentigo maligna type), extending to the tissue edges.     9/27/2023 Surgery    Skin, right neck, excision:  - Residual melanoma in-situ (lentigo maligna); prior biopsy site reaction.  - Lentigo (incidental).  - Inked margins with no tumor seen.          History of Present Illness: 80-year-old male with history of melanoma and basal cancer was excised in the past here with a new lesion on his left forearm.  This was biopsied confirming basal cell cancer.  -Interval History: Comes in of this excised but this was found on routine Derm visit.    Review of Systems:  Review of Systems   Constitutional: Negative.    HENT: Negative.     Eyes: Negative.    Respiratory: Negative.     Cardiovascular: Negative.    Gastrointestinal: Negative.    Endocrine: Negative.    Genitourinary: Negative.    Musculoskeletal: Negative.    Skin: Negative.    Allergic/Immunologic: Negative.    Neurological: Negative.    Hematological: Negative.    Psychiatric/Behavioral: Negative.     All other systems reviewed and are negative.      Patient Active Problem List   Diagnosis    Pulmonary nodule    Diabetes 1.5, managed as type 2 (HCC)    Accelerated hypertension    History of melanoma    Encounter for follow-up surveillance of melanoma    COVID-19    Orthostatic hypotension    Ambulatory dysfunction    Gastritis    Dysplastic nevus    Melanoma in situ of upper extremity, including shoulder (HCC)    Basal cell carcinoma (BCC) of skin of left upper extremity including shoulder     Past Medical History:   Diagnosis Date    Arthritis     Diabetes mellitus (HCC)      Past Surgical History:   Procedure Laterality Date    CT NEEDLE BIOPSY LUNG  11/2/2023    SKIN BIOPSY       No family history on file.  Social History     Socioeconomic History    Marital status: /Civil Union     Spouse name: Not on file    Number of children: Not on file    Years of education: Not on  file    Highest education level: Not on file   Occupational History    Not on file   Tobacco Use    Smoking status: Former     Current packs/day: 0.00     Types: Cigarettes     Quit date: 12/3/2015     Years since quittin.2    Smokeless tobacco: Former    Tobacco comments:     pt quit   Substance and Sexual Activity    Alcohol use: No    Drug use: No    Sexual activity: Not on file   Other Topics Concern    Not on file   Social History Narrative    Not on file     Social Determinants of Health     Financial Resource Strain: Low Risk  (2023)    Received from [facility]    Overall Financial Resource Strain (CARDIA)     Difficulty of Paying Living Expenses: Not very hard   Food Insecurity: No Food Insecurity (2023)    Received from [facility]    Hunger Vital Sign     Worried About Running Out of Food in the Last Year: Never true     Ran Out of Food in the Last Year: Never true   Transportation Needs: No Transportation Needs (2023)    Received from [facility]    PRAPARE - Transportation     Lack of Transportation (Medical): No     Lack of Transportation (Non-Medical): No   Physical Activity: Not on file   Stress: Patient Declined (2023)    Received from [facility]    [employer] - Occupational Stress Questionnaire     Feeling of Stress : Patient declined   Social Connections: Unknown (2023)    Received from [facility]    Social Connection and Isolation Panel [NHANES]     Frequency of Communication with Friends and Family: Patient declined     Frequency of Social Gatherings with Friends and Family: Once a week     Attends Hindu Services: Patient declined     Active Member of Clubs or Organizations: Patient declined     Attends Club or Organization Meetings: Patient declined     Marital Status:    Intimate Partner Violence: Not At Risk (2023)    Received  from [facility]    Humiliation, Afraid, Rape, and Kick questionnaire     Fear of Current or Ex-Partner: No     Emotionally Abused: No     Physically Abused: No     Sexually Abused: No   Housing Stability: Low Risk  (12/17/2023)    Received from [facility]    Housing Stability Vital Sign     Unable to Pay for Housing in the Last Year: No     Number of Places Lived in the Last Year: 1     Unstable Housing in the Last Year: No       Current Outpatient Medications:     ascorbic acid (VITAMIN C) 500 MG tablet, Take 500 mg by mouth daily, Disp: , Rfl:     aspirin 81 MG tablet, Take 81 mg by mouth daily, Disp: , Rfl:     Cholecalciferol (VITAMIN D) 2000 UNITS CAPS, Take by mouth., Disp: , Rfl:     cyanocobalamin (VITAMIN B-12) 1000 MCG tablet, Take 1,000 mcg by mouth daily, Disp: , Rfl:     Epoetin Juve-epbx (RETACRIT IJ), Inject as directed, Disp: , Rfl:     finasteride (PROSCAR) 5 mg tablet, , Disp: , Rfl:     fluconazole (DIFLUCAN) 100 mg tablet, , Disp: , Rfl:     metFORMIN (GLUCOPHAGE) 1000 MG tablet, Take 1,000 mg by mouth 2 (two) times a day with meals., Disp: , Rfl:     pantoprazole (PROTONIX) 20 mg tablet, , Disp: , Rfl:     pravastatin (PRAVACHOL) 10 mg tablet, Take 10 mg by mouth daily, Disp: , Rfl:     Tiotropium Bromide Monohydrate (SPIRIVA RESPIMAT IN), Inhale, Disp: , Rfl:     amoxicillin (AMOXIL) 500 mg capsule, TAKE 1 CAPSULE BY MOUTH THREE TIMES A DAY AS DIRECTED, Disp: , Rfl:     Epoetin Juve (PROCRIT IJ), Inject as directed once a week., Disp: , Rfl:     Irbesartan (AVAPRO PO), Take by mouth. (Patient not taking: Reported on 8/21/2023), Disp: , Rfl:     Liraglutide (VICTOZA SC), Inject 1.8 mg under the skin daily at bedtime   (Patient not taking: Reported on 7/26/2023), Disp: , Rfl:     midodrine (PROAMATINE) 10 MG tablet, Take 1 tablet (10 mg total) by mouth 3 (three) times a day (Patient not taking: Reported on 8/21/2023), Disp: 90 tablet, Rfl: 0    pantoprazole  (PROTONIX) 40 mg tablet, TAKE 1 TABLET BY MOUTH EVERY DAY (Patient not taking: Reported on 9/27/2023), Disp: 30 tablet, Rfl: 0    valACYclovir (VALTREX) 500 mg tablet, Take 500 mg by mouth daily (Patient not taking: Reported on 8/21/2023), Disp: , Rfl: 3  No Known Allergies  Vitals:    03/13/24 1259   BP: 124/60   Pulse: 78   Temp: (!) 97.4 °F (36.3 °C)   SpO2: 97%       Physical Exam  Vitals reviewed.   Constitutional:       Appearance: Normal appearance.   HENT:      Head: Normocephalic and atraumatic.   Eyes:      Extraocular Movements: Extraocular movements intact.      Pupils: Pupils are equal, round, and reactive to light.   Cardiovascular:      Rate and Rhythm: Regular rhythm.      Pulses: Normal pulses.      Heart sounds: Normal heart sounds.   Pulmonary:      Breath sounds: Normal breath sounds.   Abdominal:      General: Abdomen is flat.      Palpations: Abdomen is soft.   Musculoskeletal:         General: Normal range of motion.   Skin:     General: Skin is warm and dry.      Comments: 8 mm scab, left forearm approximately 5 cm proximal to the ulnar head.   Neurological:      General: No focal deficit present.      Mental Status: He is alert and oriented to person, place, and time.           Results:  Labs:  2/28/2024 biopsy distal left forearm showing basal carcinoma superficial multicentric and nodular TXMX    Imaging  CT chest wo contrast    Result Date: 3/3/2024  Narrative: History: Malignant lung neoplasm.   Exam: CT of the chest.   Technique: Using helical technique, 2.5 mm collimated noncontrast axial images were obtained through the chest. Coronal reformations were obtained.   Comparison: CT 10/3/2023.   Chest CT. Lungs/Pleura: There is mild pleural and pulmonary scarring, a small linear scarlike lesion laterally at the upper left pleura is 6 x 3 mm unchanged. A peripheral nodular lesion of the left upper lobe is 8 x 6 mm, decreased compared to previous. There is previous right upper lobectomy.  There is mild scarring or discoid atelectasis in right lower lobe. No other mass is demonstrated. There is no evidence of consolidation, no evidence of pleural effusion. There is mild tracheal and central bronchial calcification. Mediastinum/Lymph nodes: There is no evidence of hilar or mediastinal adenopathy. Heart/Vessels: Aorta is normal caliber, aortic lumen is not evaluated without the use of intravenous contrast. There is calcification of the aortic arch and slightly of the descending thoracic aorta. There is relatively dense coronary arterial calcification. Heart size is normal. Upper abdomen: There is no evidence of adrenal mass. Spleen is normal size. The gallbladder contains fluid and multiple dependent small calcified gallstones, without wall thickening or inflammatory change. There is a tiny calcified granuloma in the liver. Bones: There is mild to moderate spurring of the thoracic spine diffusely. No suspicious bone lesion is identified.    Impression: Impression: Previous right upper lobectomy. Peripheral left upper lobe nodule decreased in size compared to previous, scarlike lesion more superiorly unchanged. CT examination performed with dose lowering protocol in accordance with ALARA. Workstation:[id number]    I reviewed the above laboratory and imaging data.    Discussion/Summary: 78-year-old man, basal cell cancer of left forearm.  Will plan for in office excision.

## 2024-04-15 ENCOUNTER — PROCEDURE VISIT (OUTPATIENT)
Dept: SURGICAL ONCOLOGY | Facility: CLINIC | Age: 79
End: 2024-04-15
Payer: COMMERCIAL

## 2024-04-15 VITALS
TEMPERATURE: 97.4 F | HEIGHT: 69 IN | BODY MASS INDEX: 22.1 KG/M2 | SYSTOLIC BLOOD PRESSURE: 130 MMHG | WEIGHT: 149.2 LBS | DIASTOLIC BLOOD PRESSURE: 70 MMHG | OXYGEN SATURATION: 96 % | HEART RATE: 84 BPM

## 2024-04-15 DIAGNOSIS — Z85.820 HISTORY OF MELANOMA: ICD-10-CM

## 2024-04-15 DIAGNOSIS — Z85.820 ENCOUNTER FOR FOLLOW-UP SURVEILLANCE OF MELANOMA: ICD-10-CM

## 2024-04-15 DIAGNOSIS — C44.619 BASAL CELL CARCINOMA (BCC) OF SKIN OF LEFT UPPER EXTREMITY INCLUDING SHOULDER: Primary | ICD-10-CM

## 2024-04-15 DIAGNOSIS — Z08 ENCOUNTER FOR FOLLOW-UP SURVEILLANCE OF MELANOMA: ICD-10-CM

## 2024-04-15 PROCEDURE — 11603 EXC TR-EXT MAL+MARG 2.1-3 CM: CPT | Performed by: SURGERY

## 2024-04-15 RX ORDER — ALBUTEROL SULFATE 90 UG/1
2 AEROSOL, METERED RESPIRATORY (INHALATION) EVERY 6 HOURS PRN
COMMUNITY
Start: 2024-03-19

## 2024-04-15 NOTE — PROGRESS NOTES
Skin excision    Date/Time: 4/15/2024 1:15 PM    Performed by: Ernie Villegas MD  Authorized by: Ernie Villegas MD  Universal Protocol:  Consent: Verbal consent obtained. Written consent obtained.  Consent given by: patient  Patient understanding: patient states understanding of the procedure being performed  Patient consent: the patient's understanding of the procedure matches consent given    Procedure Details - Skin Excision:     Number of Lesions:  1  Lesion 1:     Body area:  Upper extremity    Upper extremity location:  L lower arm    Malignancy: malignant lesion      Skin cancer type: basal cell carcinoma          Final defect size (mm):  25    Cosmetic?: Yes      Closure complexity: intermediate      Surgical defect:  2.5 x 1.5 x 0.5 cm     Repair Comments: Consent obtained from patient.  Left forearm target identified, prepped, and draped.  Lesion identified and anesthetized with 13 cc of local anesthesia.  5 mm margins drawn around scab and incised sharply.  Cautery used to obtain full-thickness excision.  2.5 x 1.5 x 0.5 cm defect resulted from excision.  Closure performed using 3-0 Vicryl for deep dermal layers followed by running 4-0 nylon suture for skin closure.  Antibiotic ointment applied followed by Band-Aid for dressing.

## 2024-04-30 ENCOUNTER — OFFICE VISIT (OUTPATIENT)
Dept: SURGICAL ONCOLOGY | Facility: CLINIC | Age: 79
End: 2024-04-30
Payer: COMMERCIAL

## 2024-04-30 VITALS
HEART RATE: 88 BPM | TEMPERATURE: 98.7 F | BODY MASS INDEX: 21.86 KG/M2 | WEIGHT: 148 LBS | SYSTOLIC BLOOD PRESSURE: 138 MMHG | DIASTOLIC BLOOD PRESSURE: 60 MMHG | OXYGEN SATURATION: 98 %

## 2024-04-30 DIAGNOSIS — C44.619 BASAL CELL CARCINOMA (BCC) OF SKIN OF LEFT UPPER EXTREMITY INCLUDING SHOULDER: Primary | ICD-10-CM

## 2024-04-30 DIAGNOSIS — Z85.820 HISTORY OF MELANOMA: ICD-10-CM

## 2024-04-30 DIAGNOSIS — Z85.820 ENCOUNTER FOR FOLLOW-UP SURVEILLANCE OF MELANOMA: ICD-10-CM

## 2024-04-30 DIAGNOSIS — Z08 ENCOUNTER FOR FOLLOW-UP SURVEILLANCE OF MELANOMA: ICD-10-CM

## 2024-04-30 PROCEDURE — 99214 OFFICE O/P EST MOD 30 MIN: CPT | Performed by: SURGERY

## 2024-04-30 RX ORDER — FLUTICASONE PROPIONATE 50 MCG
SPRAY, SUSPENSION (ML) NASAL
COMMUNITY
Start: 2024-04-17

## 2024-04-30 NOTE — PROGRESS NOTES
Surgical Oncology Follow Up       [facility] [place]  [address] [facility] [address]  [phone]    Viktor TITA Ford  1945  [id number]  [facility] [place]  [address] [facility] [address]  [phone]    Chief Complaint   Patient presents with    Follow-up       Assessment/Plan:    No problem-specific Assessment & Plan notes found for this encounter.       Diagnoses and all orders for this visit:    Basal cell carcinoma (BCC) of skin of left upper extremity including shoulder    Encounter for follow-up surveillance of melanoma    History of melanoma    Other orders  -     fluticasone (FLONASE) 50 mcg/act nasal spray; spray 2 sprays into each nostril every day      Advance Care Planning/Advance Directives:  Discussed disease status, cancer treatment plans and/or cancer treatment goals with the patient.     Oncology History   History of melanoma   2/4/2015 Biopsy    Left lateral forearm shave biopsy    Melanoma, 0.25 mm  Mitotic index: 0  Ulceration: not seen    At least Stage IA- pT1a, pNx     3/17/2015 Surgery    Wide excision (Dr. Villegas)    Prior biopsy site reaction; melanocytic hyperplasia     Dysplastic nevus   7/20/2023 Biopsy    Consult case (12 slides [id number] [facility], collected 6/22/2023):     Skin, right paraspinal back T3, shave biopsy:     Lentiginous compound dysplastic nevus with severe atypia; approaching peripheral specimen margin    Skin, right mid back T5, shave biopsy:     Lentiginous compound dysplastic nevus with severe atypia; extending to peripheral specimen margin        8/21/2023 Surgery    Skin, back, inferior, excision:  Focus of compound melanocytic proliferation and scar, compatible with residual compound dysplastic nevus; margins free    Skin, superior back, excision:  Scar.No residual melanocytic neoplasm  identified in the examined sections.     Melanoma in situ of upper extremity, including shoulder (HCC)    Biopsy    Skin, right shoulder, punch biopsy:  Severely atypical lentiginous junctional melanocytic proliferation focally consistent with melanoma in situ (lentigo maligna type), extending to the tissue edges.     9/27/2023 Surgery    Skin, right neck, excision:  - Residual melanoma in-situ (lentigo maligna); prior biopsy site reaction.  - Lentigo (incidental).  - Inked margins with no tumor seen.      Basal cell carcinoma (BCC) of skin of left upper extremity including shoulder   2/28/2024 Biopsy    A. Skin, medial distal left forearm, shave biopsy:     BASAL CELL CARCINOMA (SUPERFICIAL AND NODULAR TYPE).     4/15/2024 Surgery    Left wrist, excision biopsy:  - Residual Basal Cell Carcinoma And Scar   Margins are clear for basal cell carcinoma, deeper sections were also examined.         History of Present Illness: 78-year-old man here for 6-month follow-up visit as well as for suture removal from recent excision of left forearm basal cell cancer.  -Interval History: No issues since his last visit.  He sees dermatology team every 3 months.    Review of Systems:  Review of Systems   Constitutional: Negative.    HENT: Negative.     Eyes: Negative.    Respiratory: Negative.     Cardiovascular: Negative.    Gastrointestinal: Negative.    Endocrine: Negative.    Genitourinary: Negative.    Musculoskeletal: Negative.    Skin: Negative.    Allergic/Immunologic: Negative.    Neurological: Negative.    Hematological: Negative.    Psychiatric/Behavioral: Negative.     All other systems reviewed and are negative.      Patient Active Problem List   Diagnosis    Pulmonary nodule    Diabetes 1.5, managed as type 2 (HCC)    Accelerated hypertension    History of melanoma    Encounter for follow-up surveillance of melanoma    COVID-19    Orthostatic hypotension    Ambulatory dysfunction    Gastritis    Dysplastic nevus     Melanoma in situ of upper extremity, including shoulder (HCC)    Basal cell carcinoma (BCC) of skin of left upper extremity including shoulder     Past Medical History:   Diagnosis Date    Arthritis     Diabetes mellitus (HCC)      Past Surgical History:   Procedure Laterality Date    CT NEEDLE BIOPSY LUNG  2023    SKIN BIOPSY       No family history on file.  Social History     Socioeconomic History    Marital status: /Civil Union     Spouse name: Not on file    Number of children: Not on file    Years of education: Not on file    Highest education level: Not on file   Occupational History    Not on file   Tobacco Use    Smoking status: Former     Current packs/day: 0.00     Types: Cigarettes     Quit date: 12/3/2015     Years since quittin.4    Smokeless tobacco: Former    Tobacco comments:     pt quit   Substance and Sexual Activity    Alcohol use: No    Drug use: No    Sexual activity: Not on file   Other Topics Concern    Not on file   Social History Narrative    Not on file     Social Determinants of Health     Financial Resource Strain: Low Risk  (2023)    Received from [facility]    Overall Financial Resource Strain (CARDIA)     Difficulty of Paying Living Expenses: Not very hard   Food Insecurity: No Food Insecurity (2023)    Received from [facility]    Hunger Vital Sign     Worried About Running Out of Food in the Last Year: Never true     Ran Out of Food in the Last Year: Never true   Transportation Needs: No Transportation Needs (2023)    Received from [facility]    PRAPARE - Transportation     Lack of Transportation (Medical): No     Lack of Transportation (Non-Medical): No   Physical Activity: Not on file   Stress: Patient Declined (2023)    Received from [facility]    [place] [employer] Occupational Health - Occupational Stress Questionnaire     Feeling of Stress : Patient declined    Social Connections: Unknown (12/17/2023)    Received from [facility]    Social Connection and Isolation Panel [NHANES]     Frequency of Communication with Friends and Family: Patient declined     Frequency of Social Gatherings with Friends and Family: Once a week     Attends Mormonism Services: Patient declined     Active Member of Clubs or Organizations: Patient declined     Attends Club or Organization Meetings: Patient declined     Marital Status:    Intimate Partner Violence: Not At Risk (12/17/2023)    Received from [facility]    Humiliation, Afraid, Rape, and Kick questionnaire     Fear of Current or Ex-Partner: No     Emotionally Abused: No     Physically Abused: No     Sexually Abused: No   Housing Stability: Low Risk  (12/17/2023)    Received from [facility]    Housing Stability Vital Sign     Unable to Pay for Housing in the Last Year: No     Number of Places Lived in the Last Year: 1     Unstable Housing in the Last Year: No       Current Outpatient Medications:     ascorbic acid (VITAMIN C) 500 MG tablet, Take 500 mg by mouth daily, Disp: , Rfl:     aspirin 81 MG tablet, Take 81 mg by mouth daily, Disp: , Rfl:     Cholecalciferol (VITAMIN D) 2000 UNITS CAPS, Take by mouth., Disp: , Rfl:     cyanocobalamin (VITAMIN B-12) 1000 MCG tablet, Take 1,000 mcg by mouth daily, Disp: , Rfl:     Epoetin Juve-epbx (RETACRIT IJ), Inject as directed, Disp: , Rfl:     finasteride (PROSCAR) 5 mg tablet, , Disp: , Rfl:     fluconazole (DIFLUCAN) 100 mg tablet, , Disp: , Rfl:     fluticasone (FLONASE) 50 mcg/act nasal spray, spray 2 sprays into each nostril every day, Disp: , Rfl:     metFORMIN (GLUCOPHAGE) 1000 MG tablet, Take 1,000 mg by mouth 2 (two) times a day with meals., Disp: , Rfl:     pravastatin (PRAVACHOL) 10 mg tablet, Take 10 mg by mouth daily, Disp: , Rfl:     Tiotropium Bromide Monohydrate (SPIRIVA RESPIMAT IN), Inhale, Disp: , Rfl:      albuterol (PROVENTIL HFA,VENTOLIN HFA) 90 mcg/act inhaler, Inhale 2 puffs every 6 (six) hours as needed (Patient not taking: Reported on 4/30/2024), Disp: , Rfl:     amoxicillin (AMOXIL) 500 mg capsule, TAKE 1 CAPSULE BY MOUTH THREE TIMES A DAY AS DIRECTED, Disp: , Rfl:     Epoetin Juve (PROCRIT IJ), Inject as directed once a week., Disp: , Rfl:     Irbesartan (AVAPRO PO), Take by mouth. (Patient not taking: Reported on 8/21/2023), Disp: , Rfl:     Liraglutide (VICTOZA SC), Inject 1.8 mg under the skin daily at bedtime   (Patient not taking: Reported on 7/26/2023), Disp: , Rfl:     midodrine (PROAMATINE) 10 MG tablet, Take 1 tablet (10 mg total) by mouth 3 (three) times a day (Patient not taking: Reported on 8/21/2023), Disp: 90 tablet, Rfl: 0    pantoprazole (PROTONIX) 20 mg tablet, , Disp: , Rfl:     pantoprazole (PROTONIX) 40 mg tablet, TAKE 1 TABLET BY MOUTH EVERY DAY (Patient not taking: Reported on 9/27/2023), Disp: 30 tablet, Rfl: 0    valACYclovir (VALTREX) 500 mg tablet, Take 500 mg by mouth daily (Patient not taking: Reported on 8/21/2023), Disp: , Rfl: 3  No Known Allergies  Vitals:    04/30/24 1056   BP: 138/60   Pulse: 88   Temp: 98.7 °F (37.1 °C)   SpO2: 98%       Physical Exam  Vitals reviewed.   Constitutional:       Appearance: Normal appearance.   HENT:      Head: Normocephalic and atraumatic.      Right Ear: External ear normal.      Nose: Nose normal.   Eyes:      Extraocular Movements: Extraocular movements intact.      Pupils: Pupils are equal, round, and reactive to light.   Cardiovascular:      Rate and Rhythm: Normal rate and regular rhythm.      Heart sounds: Normal heart sounds.   Pulmonary:      Effort: Pulmonary effort is normal.      Breath sounds: Normal breath sounds.   Abdominal:      General: Abdomen is flat.      Palpations: Abdomen is soft.   Musculoskeletal:         General: Normal range of motion.      Cervical back: Normal range of motion and neck supple.   Skin:     General:  Skin is warm and dry.      Comments: Left forearm incision clean dry intact;    Prior melanoma excision site left forearm without evidence of recurrence visible or palpable.  Atypical nevi excision sites on back without evidence of recurrence.  Negative for cervical and axillary adenopathy.   Neurological:      General: No focal deficit present.      Mental Status: He is alert and oriented to person, place, and time.   Psychiatric:         Mood and Affect: Mood normal.         Behavior: Behavior normal.           Results:  Labs:  Case Report   Surgical Pathology Report                         Case: [id number]                                   Authorizing Provider:  Ernie Villegas MD        Collected:           04/15/2024 145               Ordering Location:     [facility]     Received:            04/15/2024 [facility]                                      Surgical Oncology                                                                                    [facility]                                                         Pathologist:           Andrew Rodriguez MD                                                                           Specimen:    Lesion, left wrist                                                                        Final Diagnosis   This case was sent for processing and evaluation to [facility]. The final diagnosis is issued below; their complete report is viewable in the attached link.     A. Left wrist, excision biopsy:  - Residual Basal Cell Carcinoma And Scar (See Note).     Note: Margins are clear for basal cell carcinoma, deeper sections were also examined.   Electronically signed by Andrew Rodriguez MD on 4/24/2024 at 11:43 AM       Imaging  No results found.  I reviewed the above laboratory and imaging data.    Discussion/Summary: Stage I melanoma of forearm, now post  excision of basal cell cancer from left forearm as well.  Margins clear.  Sutures removed at this visit.  Plan to follow-up in 6 months for continued melanoma surveillance.

## 2024-06-04 ENCOUNTER — TELEPHONE (OUTPATIENT)
Dept: HEMATOLOGY ONCOLOGY | Facility: CLINIC | Age: 79
End: 2024-06-04

## 2024-06-04 NOTE — TELEPHONE ENCOUNTER
Medical Records Request   Who are you speaking with? Physician Office   If it is not the patient, are they listed on an active communication consent form? Yes   What is the purpose of this call? Requesting medical records   What records are being requested? Reports   Details/ Additional Info Pathology report   Which provider does the patient see? Dr. Villegas   Fax Number   Person's name (Attention:)   [phone] Attn: Dr Jordan   Facility call back number [phone]    Patient call back number na

## 2024-10-28 ENCOUNTER — TELEPHONE (OUTPATIENT)
Dept: SURGICAL ONCOLOGY | Facility: CLINIC | Age: 79
End: 2024-10-28

## 2024-10-28 NOTE — TELEPHONE ENCOUNTER
LM asking patient to call back to [facility] to reschedule 11/4 appt with Dr. Villegas to 11/5.

## 2024-11-05 ENCOUNTER — OFFICE VISIT (OUTPATIENT)
Dept: SURGICAL ONCOLOGY | Facility: CLINIC | Age: 79
End: 2024-11-05
Payer: COMMERCIAL

## 2024-11-05 VITALS
DIASTOLIC BLOOD PRESSURE: 54 MMHG | HEIGHT: 69 IN | OXYGEN SATURATION: 96 % | SYSTOLIC BLOOD PRESSURE: 136 MMHG | WEIGHT: 154 LBS | BODY MASS INDEX: 22.81 KG/M2 | TEMPERATURE: 97.2 F | HEART RATE: 81 BPM

## 2024-11-05 DIAGNOSIS — C44.619 BASAL CELL CARCINOMA (BCC) OF SKIN OF LEFT UPPER EXTREMITY INCLUDING SHOULDER: ICD-10-CM

## 2024-11-05 DIAGNOSIS — D03.61 MELANOMA IN SITU OF RIGHT UPPER EXTREMITY INCLUDING SHOULDER (HCC): ICD-10-CM

## 2024-11-05 DIAGNOSIS — Z08 ENCOUNTER FOR FOLLOW-UP SURVEILLANCE OF MELANOMA: Primary | ICD-10-CM

## 2024-11-05 DIAGNOSIS — Z85.820 HISTORY OF MELANOMA: ICD-10-CM

## 2024-11-05 DIAGNOSIS — Z85.820 ENCOUNTER FOR FOLLOW-UP SURVEILLANCE OF MELANOMA: Primary | ICD-10-CM

## 2024-11-05 PROCEDURE — 99213 OFFICE O/P EST LOW 20 MIN: CPT | Performed by: SURGERY

## 2024-11-05 NOTE — PROGRESS NOTES
Surgical Oncology Follow Up       [facility] [place]  [address] [facility] [address]  [phone]    Viktor RIVERS Liliana  1945  [id number]  [facility] [place]  [address] [facility] [address]  [phone]    Chief Complaint   Patient presents with    Follow-up       Assessment/Plan:    No problem-specific Assessment & Plan notes found for this encounter.       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     Oncology History   History of melanoma   2/4/2015 Biopsy    Left lateral forearm shave biopsy    Melanoma, 0.25 mm  Mitotic index: 0  Ulceration: not seen    At least Stage IA- pT1a, pNx     3/17/2015 Surgery    Wide excision (Dr. Villegas)    Prior biopsy site reaction; melanocytic hyperplasia     Dysplastic nevus   7/20/2023 Biopsy    Consult case (12 slides [id number] [facility], collected 6/22/2023):     Skin, right paraspinal back T3, shave biopsy:     Lentiginous compound dysplastic nevus with severe atypia; approaching peripheral specimen margin    Skin, right mid back T5, shave biopsy:     Lentiginous compound dysplastic nevus with severe atypia; extending to peripheral specimen margin        8/21/2023 Surgery    Skin, back, inferior, excision:  Focus of compound melanocytic proliferation and scar, compatible with residual compound dysplastic nevus; margins free    Skin, superior back, excision:  Scar.No residual melanocytic neoplasm identified in the examined sections.      Melanoma in situ of upper extremity, including shoulder (HCC)    Biopsy    Skin, right shoulder, punch biopsy:  Severely atypical lentiginous junctional melanocytic proliferation focally consistent with melanoma in situ (lentigo maligna type), extending to the tissue edges.     9/27/2023 Surgery    Skin, right neck, excision:  - Residual melanoma in-situ (lentigo maligna); prior biopsy site reaction.  - Lentigo (incidental).  - Inked margins with no tumor seen.      Basal cell carcinoma (BCC) of skin of left upper extremity including shoulder   2/28/2024 Biopsy    A. Skin, medial distal left forearm, shave biopsy:     BASAL CELL CARCINOMA (SUPERFICIAL AND NODULAR TYPE).     4/15/2024 Surgery    Left wrist, excision biopsy:  - Residual Basal Cell Carcinoma And Scar   Margins are clear for basal cell carcinoma, deeper sections were also examined.         History of Present Illness: 79-year-old man, history of basal cell cancers, atypical nevi, and distant history of melanoma  -Interval History: Here for surveillance.  No new skin complaints to report.  He continues to follow closely with dermatology team on a regular basis.    Review of Systems:  Review of Systems   Constitutional: Negative.    HENT: Negative.     Eyes: Negative.    Respiratory: Negative.     Cardiovascular: Negative.    Gastrointestinal: Negative.    Endocrine: Negative.    Genitourinary: Negative.    Musculoskeletal: Negative.    Skin: Negative.    Allergic/Immunologic: Negative.    Neurological: Negative.    Hematological: Negative.    Psychiatric/Behavioral: Negative.     All other systems reviewed and are negative.      Patient Active Problem List   Diagnosis    Pulmonary nodule    Diabetes 1.5, managed as type 2 (HCC)    Accelerated hypertension    History of melanoma    Encounter for follow-up surveillance of melanoma    COVID-19    Orthostatic hypotension    Ambulatory dysfunction    Gastritis    Dysplastic nevus    Melanoma in situ of upper  extremity, including shoulder (HCC)    Basal cell carcinoma (BCC) of skin of left upper extremity including shoulder     Past Medical History:   Diagnosis Date    Arthritis     Diabetes mellitus (HCC)      Past Surgical History:   Procedure Laterality Date    CT NEEDLE BIOPSY LUNG  2023    SKIN BIOPSY       No family history on file.  Social History     Socioeconomic History    Marital status: /Civil Union     Spouse name: Not on file    Number of children: Not on file    Years of education: Not on file    Highest education level: Not on file   Occupational History    Not on file   Tobacco Use    Smoking status: Former     Current packs/day: 0.00     Types: Cigarettes     Quit date: 12/3/2015     Years since quittin.9    Smokeless tobacco: Former    Tobacco comments:     pt quit   Substance and Sexual Activity    Alcohol use: No    Drug use: No    Sexual activity: Not on file   Other Topics Concern    Not on file   Social History Narrative    Not on file     Social Determinants of Health     Financial Resource Strain: Low Risk  (2023)    [facility]    Overall Financial Resource Strain (CARDIA)     Difficulty of Paying Living Expenses: Not very hard   Food Insecurity: No Food Insecurity (2023)    Received from [facility], [facility]    Hunger Vital Sign     Worried About Running Out of Food in the Last Year: Never true     Ran Out of Food in the Last Year: Never true   Transportation Needs: No Transportation Needs (2023)    Received from [facility], [facility]    PRAPARE - Transportation     Lack of Transportation (Medical): No     Lack of Transportation (Non-Medical): No   Physical Activity: Not on file   Stress: Patient Declined (2023)    Received from [facility], [facility]  Occupational Health - Occupational Stress Questionnaire     Feeling of Stress : Patient declined   Social Connections: Unknown (12/17/2023)    Received from [facility], [facility]    Social Connection and Isolation Panel [NHANES]     Frequency of Communication with Friends and Family: Patient declined     Frequency of Social Gatherings with Friends and Family: Once a week     Attends [employer] Services: Patient declined     Active Member of Clubs or Organizations: Patient declined     Attends Club or Organization Meetings: Patient declined     Marital Status:    Intimate Partner Violence: Not At Risk (12/17/2023)    Received from [facility], [facility]    Humiliation, Afraid, Rape, and Kick questionnaire     Fear of Current or Ex-Partner: No     Emotionally Abused: No     Physically Abused: No     Sexually Abused: No   Housing Stability: Low Risk  (12/17/2023)    Received from [facility], [facility]    Housing Stability Vital Sign     Unable to Pay for Housing in the Last Year: No     Number of Places Lived in the Last Year: 1     Unstable Housing in the Last Year: No       Current Outpatient Medications:     ascorbic acid (VITAMIN C) 500 MG tablet, Take 500 mg by mouth daily, Disp: , Rfl:     aspirin 81 MG tablet, Take 81 mg by mouth daily, Disp: , Rfl:     Cholecalciferol (VITAMIN D) 2000 UNITS CAPS, Take by mouth., Disp: , Rfl:     cyanocobalamin (VITAMIN B-12) 1000 MCG tablet, Take 1,000 mcg by mouth daily, Disp: , Rfl:     Epoetin Juve-epbx (RETACRIT IJ), Inject as directed, Disp: , Rfl:     finasteride (PROSCAR) 5 mg tablet, , Disp: , Rfl:     fluconazole (DIFLUCAN) 100 mg tablet, , Disp: , Rfl:     fluticasone (FLONASE) 50 mcg/act nasal spray, spray 2 sprays into each nostril every day, Disp: , Rfl:     metFORMIN (GLUCOPHAGE) 1000 MG tablet, Take 1,000 mg by mouth 2 (two) times a day with meals 1  time a day, Disp: , Rfl:     pravastatin (PRAVACHOL) 10 mg tablet, Take 10 mg by mouth daily, Disp: , Rfl:     Tiotropium Bromide Monohydrate (SPIRIVA RESPIMAT IN), Inhale, Disp: , Rfl:     albuterol (PROVENTIL HFA,VENTOLIN HFA) 90 mcg/act inhaler, Inhale 2 puffs every 6 (six) hours as needed (Patient not taking: Reported on 4/30/2024), Disp: , Rfl:     amoxicillin (AMOXIL) 500 mg capsule, TAKE 1 CAPSULE BY MOUTH THREE TIMES A DAY AS DIRECTED, Disp: , Rfl:     Epoetin Juve (PROCRIT IJ), Inject as directed once a week., Disp: , Rfl:     Irbesartan (AVAPRO PO), Take by mouth. (Patient not taking: Reported on 8/21/2023), Disp: , Rfl:     Liraglutide (VICTOZA SC), Inject 1.8 mg under the skin daily at bedtime   (Patient not taking: Reported on 7/26/2023), Disp: , Rfl:     midodrine (PROAMATINE) 10 MG tablet, Take 1 tablet (10 mg total) by mouth 3 (three) times a day (Patient not taking: Reported on 8/21/2023), Disp: 90 tablet, Rfl: 0    pantoprazole (PROTONIX) 20 mg tablet, , Disp: , Rfl:     pantoprazole (PROTONIX) 40 mg tablet, TAKE 1 TABLET BY MOUTH EVERY DAY (Patient not taking: Reported on 9/27/2023), Disp: 30 tablet, Rfl: 0    valACYclovir (VALTREX) 500 mg tablet, Take 500 mg by mouth daily (Patient not taking: Reported on 8/21/2023), Disp: , Rfl: 3  No Known Allergies  Vitals:    11/05/24 1454   BP: 136/54   Pulse: 81   Temp: (!) 97.2 °F (36.2 °C)   SpO2: 96%       Physical Exam  Vitals reviewed.   Constitutional:       Appearance: Normal appearance.   HENT:      Head: Normocephalic and atraumatic.      Right Ear: External ear normal.      Left Ear: External ear normal.   Eyes:      Extraocular Movements: Extraocular movements intact.      Pupils: Pupils are equal, round, and reactive to light.   Cardiovascular:      Rate and Rhythm: Normal rate and regular rhythm.      Heart sounds: Normal heart sounds.   Pulmonary:      Effort: Pulmonary effort is normal.      Breath sounds: Normal breath sounds.   Abdominal:       General: Abdomen is flat.      Palpations: Abdomen is soft.   Musculoskeletal:         General: Normal range of motion.      Cervical back: Normal range of motion and neck supple.   Skin:     General: Skin is warm and dry.      Comments: Multiple excision sites back and trunk area.  No evidence recurrence visible or palpable.  No adenopathy.   Neurological:      General: No focal deficit present.      Mental Status: He is alert and oriented to person, place, and time.   Psychiatric:         Mood and Affect: Mood normal.         Behavior: Behavior normal.           Results:  Labs:  none    Imaging  No results found.  I reviewed the above laboratory and imaging data.    Discussion/Summary: 79-year-old man, history of basal cell cancers, atypical nevi, and melanoma in situ removed in the past.  No evidence of recurrence.  Follow-up on a regular basis with dermatology team.  I will see him on a as needed basis.